# Patient Record
Sex: FEMALE | Race: WHITE | Employment: OTHER | URBAN - METROPOLITAN AREA
[De-identification: names, ages, dates, MRNs, and addresses within clinical notes are randomized per-mention and may not be internally consistent; named-entity substitution may affect disease eponyms.]

---

## 2018-06-30 ENCOUNTER — HOSPITAL ENCOUNTER (EMERGENCY)
Age: 68
Discharge: HOME | End: 2018-06-30
Attending: EMERGENCY MEDICINE
Payer: MEDICARE

## 2018-06-30 ENCOUNTER — APPOINTMENT (OUTPATIENT)
Dept: CT IMAGING | Age: 68
End: 2018-06-30
Attending: EMERGENCY MEDICINE
Payer: MEDICARE

## 2018-06-30 VITALS
SYSTOLIC BLOOD PRESSURE: 113 MMHG | WEIGHT: 206 LBS | BODY MASS INDEX: 32.33 KG/M2 | TEMPERATURE: 98 F | DIASTOLIC BLOOD PRESSURE: 58 MMHG | HEART RATE: 56 BPM | RESPIRATION RATE: 16 BRPM | HEIGHT: 67 IN | OXYGEN SATURATION: 98 %

## 2018-06-30 DIAGNOSIS — R42 VERTIGO: Primary | ICD-10-CM

## 2018-06-30 LAB
ANION GAP SERPL CALC-SCNC: 8 MMOL/L (ref 5–15)
BUN SERPL-MCNC: 21 MG/DL (ref 6–20)
BUN/CREAT SERPL: 27 (ref 12–20)
CALCIUM SERPL-MCNC: 9.6 MG/DL (ref 8.5–10.1)
CHLORIDE SERPL-SCNC: 107 MMOL/L (ref 97–108)
CO2 SERPL-SCNC: 27 MMOL/L (ref 21–32)
CREAT SERPL-MCNC: 0.78 MG/DL (ref 0.55–1.02)
ERYTHROCYTE [DISTWIDTH] IN BLOOD BY AUTOMATED COUNT: 12.7 % (ref 11.5–14.5)
GLUCOSE SERPL-MCNC: 94 MG/DL (ref 65–100)
HCT VFR BLD AUTO: 40.2 % (ref 35–47)
HGB BLD-MCNC: 13.2 G/DL (ref 11.5–16)
MAGNESIUM SERPL-MCNC: 2.3 MG/DL (ref 1.6–2.4)
MCH RBC QN AUTO: 29.7 PG (ref 26–34)
MCHC RBC AUTO-ENTMCNC: 32.8 G/DL (ref 30–36.5)
MCV RBC AUTO: 90.3 FL (ref 80–99)
NRBC # BLD: 0 K/UL (ref 0–0.01)
NRBC BLD-RTO: 0 PER 100 WBC
PLATELET # BLD AUTO: 199 K/UL (ref 150–400)
PMV BLD AUTO: 9.9 FL (ref 8.9–12.9)
POTASSIUM SERPL-SCNC: 4.5 MMOL/L (ref 3.5–5.1)
RBC # BLD AUTO: 4.45 M/UL (ref 3.8–5.2)
SODIUM SERPL-SCNC: 142 MMOL/L (ref 136–145)
WBC # BLD AUTO: 5.8 K/UL (ref 3.6–11)

## 2018-06-30 PROCEDURE — 83735 ASSAY OF MAGNESIUM: CPT | Performed by: EMERGENCY MEDICINE

## 2018-06-30 PROCEDURE — 74011636320 HC RX REV CODE- 636/320: Performed by: EMERGENCY MEDICINE

## 2018-06-30 PROCEDURE — 96374 THER/PROPH/DIAG INJ IV PUSH: CPT

## 2018-06-30 PROCEDURE — 70450 CT HEAD/BRAIN W/O DYE: CPT

## 2018-06-30 PROCEDURE — 36415 COLL VENOUS BLD VENIPUNCTURE: CPT | Performed by: EMERGENCY MEDICINE

## 2018-06-30 PROCEDURE — 70496 CT ANGIOGRAPHY HEAD: CPT

## 2018-06-30 PROCEDURE — 74011250636 HC RX REV CODE- 250/636: Performed by: EMERGENCY MEDICINE

## 2018-06-30 PROCEDURE — 99284 EMERGENCY DEPT VISIT MOD MDM: CPT

## 2018-06-30 PROCEDURE — 85027 COMPLETE CBC AUTOMATED: CPT | Performed by: EMERGENCY MEDICINE

## 2018-06-30 PROCEDURE — 80048 BASIC METABOLIC PNL TOTAL CA: CPT | Performed by: EMERGENCY MEDICINE

## 2018-06-30 RX ORDER — SODIUM CHLORIDE 0.9 % (FLUSH) 0.9 %
10 SYRINGE (ML) INJECTION
Status: COMPLETED | OUTPATIENT
Start: 2018-06-30 | End: 2018-06-30

## 2018-06-30 RX ORDER — ONDANSETRON 4 MG/1
4 TABLET, FILM COATED ORAL
Qty: 20 TAB | Refills: 0 | Status: SHIPPED | OUTPATIENT
Start: 2018-06-30 | End: 2020-02-24

## 2018-06-30 RX ORDER — SODIUM CHLORIDE 9 MG/ML
50 INJECTION, SOLUTION INTRAVENOUS
Status: COMPLETED | OUTPATIENT
Start: 2018-06-30 | End: 2018-06-30

## 2018-06-30 RX ORDER — ONDANSETRON 2 MG/ML
4 INJECTION INTRAMUSCULAR; INTRAVENOUS
Status: COMPLETED | OUTPATIENT
Start: 2018-06-30 | End: 2018-06-30

## 2018-06-30 RX ADMIN — ONDANSETRON 4 MG: 2 INJECTION INTRAMUSCULAR; INTRAVENOUS at 12:59

## 2018-06-30 RX ADMIN — SODIUM CHLORIDE 50 ML/HR: 900 INJECTION, SOLUTION INTRAVENOUS at 13:20

## 2018-06-30 RX ADMIN — IOPAMIDOL 100 ML: 755 INJECTION, SOLUTION INTRAVENOUS at 13:19

## 2018-06-30 RX ADMIN — Medication 10 ML: at 13:20

## 2018-06-30 NOTE — DISCHARGE INSTRUCTIONS
You were seen for dizziness and vertigo. Your CTA head and neck were negative for any dissection, aneurysm or clot. No signs of cerebellar problems. I recommended Zofran over the counter dramamine, and resting in a quiet environment until better. Avoid driving, heights, operating machinery until better. Follow up with Neurology if symptoms do not improve in 1 week. Call if symptoms persist or worsen or you develop new neurologic symptoms.   If meclizine does not improve your symptoms, follow up with Stephen Mcguire:    [address] [facility], [address]  [address]  [phone]    Vestibular Rehab therapists  Jak Handy, PT

## 2018-06-30 NOTE — ED PROVIDER NOTES
EMERGENCY DEPARTMENT HISTORY AND PHYSICAL EXAM      Date: 6/30/2018  Patient Name: Angela Le    History of Presenting Illness     Chief Complaint   Patient presents with    Dizziness     lightheaded \"to the point that feeling like she was going to fall down\" two weeks ago onset; went to [facility] sent to ED       History Provided By: Patient and EMS    HPI: Angela Le, 79 y.o. female with PMHx significant for HLD,vertigo and collagenous colitis, presents ambulatory to the ED with cc of continued dizziness that she describes as a \"room spinning\" sensation x 2 weeks. Pt notes that her dizziness is exacerbated with standing, ambulation, and movement of her head. Pt denies any associated sxs, nor any alleviating factors. Pt states that she has been taking Meclizine with no relief of her sxs. Pt states that she has a Hx of similar sxs as the result of her vertigo. Pt explains that she was evaluated for similar sxs at [facility] 4 days ago where she was found to be non-orthostatic and was discharged with Meclizine for relief of her dizziness secondary to vertigo. Pt reports that the dose of Meclizine that she was prescribed was too potent, so she halved the dose of medication after her first use. Pt notes that the medication has been unsuccessful in treating her dizziness which prompted her to come to the ED for further testing. Pt reports that she has been able to ambulate without difficulty since the onset of her sxs, and she has collagenous colitis which causes her to have chronic diarrhea at her baseline. Pt states that she has been drinking plenty of water, and consuming lots of potassium containing foods. Pt denies any recent colds, nor any past Hx of dehydration secondary to her colitis. Pt denies any PMHx of DM, nor any social Hx of tobacco use. Pt specifically denies N/V, worsening diarrhea, tinnitus, hearing loss, vision changes, numbness, weakness, or pain.     There are no other complaints, changes, or physical findings at this time. PCP: Armida Aburto MD        Past History     Past Medical History:  Past Medical History:   Diagnosis Date    Arthritis     Gastrointestinal disorder     colitis, collagenous    Hiatal hernia     Neurological disorder 2011    vertigo        Past Surgical History:  Past Surgical History:   Procedure Laterality Date    HX APPENDECTOMY      HX HEENT  2016    cataract     HX ORTHOPAEDIC      bilateral TKR       Family History:  History reviewed. No pertinent family history. Social History:  Social History   Substance Use Topics    Smoking status: Never Smoker    Smokeless tobacco: None    Alcohol use Yes       Allergies: Allergies   Allergen Reactions    Penicillins Hives    Sulfa (Sulfonamide Antibiotics) Other (comments)     \"pupils dialate\" gantrisin    Valium [Diazepam] Other (comments)     hallucinations         Review of Systems   Review of Systems   Constitutional: Negative. Negative for chills, diaphoresis and fever. HENT: Negative. Negative for congestion, sore throat and trouble swallowing. Eyes: Negative. Negative for photophobia, pain and redness. Respiratory: Negative. Negative for cough, chest tightness, shortness of breath and wheezing. Cardiovascular: Negative. Negative for chest pain and palpitations. Gastrointestinal: Negative. Negative for abdominal pain, blood in stool, diarrhea, nausea and vomiting. Genitourinary: Negative for difficulty urinating, dysuria and frequency. Musculoskeletal: Negative. Negative for arthralgias, myalgias, neck pain and neck stiffness. Skin: Negative. Neurological: Positive for dizziness. Negative for tremors, seizures, syncope, speech difficulty, light-headedness and headaches. Psychiatric/Behavioral: Negative. Negative for confusion. The patient is not nervous/anxious. All other systems reviewed and are negative.       Physical Exam   Physical Exam   Constitutional: She is oriented to person, place, and time. She appears well-developed and well-nourished. HENT:   Head: Normocephalic. TMs clear bilaterally   Eyes: EOM are normal. Pupils are equal, round, and reactive to light. Neck: Normal range of motion. Cardiovascular: Normal rate and regular rhythm. Pulmonary/Chest: Effort normal and breath sounds normal.   Abdominal: Soft. She exhibits no distension. There is no tenderness. Neurological: She is alert and oriented to person, place, and time. No cranial nerve deficit. CN 2-12 intact, 5/5 strength in all 4 extremities, Finger to nose intact, positive Romberg, no ataxic gait   Skin: Skin is warm and dry. Psychiatric: She has a normal mood and affect. Nursing note and vitals reviewed.       Diagnostic Study Results     Labs -     Recent Results (from the past 12 hour(s))   CBC W/O DIFF    Collection Time: 06/30/18 12:18 PM   Result Value Ref Range    WBC 5.8 3.6 - 11.0 K/uL    RBC 4.45 3.80 - 5.20 M/uL    HGB 13.2 11.5 - 16.0 g/dL    HCT 40.2 35.0 - 47.0 %    MCV 90.3 80.0 - 99.0 FL    MCH 29.7 26.0 - 34.0 PG    MCHC 32.8 30.0 - 36.5 g/dL    RDW 12.7 11.5 - 14.5 %    PLATELET 681 413 - 963 K/uL    MPV 9.9 8.9 - 12.9 FL    NRBC 0.0 0  WBC    ABSOLUTE NRBC 0.00 0.00 - 0.40 K/uL   METABOLIC PANEL, BASIC    Collection Time: 06/30/18 12:18 PM   Result Value Ref Range    Sodium 142 136 - 145 mmol/L    Potassium 4.5 3.5 - 5.1 mmol/L    Chloride 107 97 - 108 mmol/L    CO2 27 21 - 32 mmol/L    Anion gap 8 5 - 15 mmol/L    Glucose 94 65 - 100 mg/dL    BUN 21 (H) 6 - 20 MG/DL    Creatinine 0.78 0.55 - 1.02 MG/DL    BUN/Creatinine ratio 27 (H) 12 - 20      GFR est AA >60 >60 ml/min/1.73m2    GFR est non-AA >60 >60 ml/min/1.73m2    Calcium 9.6 8.5 - 10.1 MG/DL   MAGNESIUM    Collection Time: 06/30/18 12:18 PM   Result Value Ref Range    Magnesium 2.3 1.6 - 2.4 mg/dL       Radiologic Studies -     CT Results  (Last 48 hours)               06/30/18 1320  CTA HEAD NECK W CONT Preliminary result    Narrative:  Prelim report: Mild partially calcified plaque within the left and right carotid   bulbs. Common and internal carotid arteries are otherwise patent and of normal   caliber. Vertebral arteries are patent and of normal caliber. No intracranial   stenosis or occlusion. Final report to follow. 06/30/18 1320  CT HEAD WO CONT Final result    Impression:  IMPRESSION: No acute intracranial hemorrhage, mass or infarct. Narrative:  INDICATION: Vertigo, persistent, central        Exam: Noncontrast CT of the brain is performed with 5 mm collimation. CT dose reduction was achieved with the use of the standardized protocol   tailored for this examination and automatic exposure control for dose   modulation. FINDINGS: There is no acute intracranial hemorrhage, mass, mass effect or   herniation. Ventricular system is normal. The gray-white matter differentiation   is well-preserved. The mastoid air cells are well pneumatized. The visualized   paranasal sinuses are normal.               Medical Decision Making   I am the first provider for this patient. I reviewed the vital signs, available nursing notes, past medical history, past surgical history, family history and social history. Vital Signs-Reviewed the patient's vital signs. Patient Vitals for the past 12 hrs:   Temp Pulse Resp BP SpO2   06/30/18 1335 - (!) 56 16 133/61 99 %   06/30/18 1152 98 °F (36.7 °C) 63 16 136/65 98 %       Pulse Oximetry Analysis - 98% on RA    Cardiac Monitor:   Rate: 62 bpm  Rhythm: Normal Sinus Rhythm      Records Reviewed: Nursing Notes, Old Medical Records, Previous electrocardiograms, Ambulance Run Sheet, Previous Radiology Studies and Previous Laboratory Studies    Provider Notes (Medical Decision Making):   Patient presents with isolated vertigo.  Most likely peripheral vertigo rather than Central vertigo however given positive Romberg, will obtain CTA of the head and neck to ensure there is no posterior involvement. . DDx: BPPV, acute labyrinthitis, vestibular neuritis, Meniere's dx, otitis media, acoustic neuroma, medication toxicity (aminoglycosides, loop diuretics, aspirin). Unlikely central cause of vertigo such as posterior mass or stroke as there are no associated red flag symptoms including diplopia, dysmetria, dysarthria, ataxia, unilateral numbness or weakness. Prudence Marceline  results have been reviewed with her. She has been counseled regarding her diagnosis. She verbally conveys understanding and agreement of the signs, symptoms, diagnosis, treatment with Meclizine and prognosis and additionally agrees to follow up as recommended with Vestibular Rehab or Neurology PRN. ED Course:   Initial assessment performed. The patients presenting problems have been discussed, and they are in agreement with the care plan formulated and outlined with them. I have encouraged them to ask questions as they arise throughout their visit. Critical Care Time:   0    Disposition:  1:57 PM  The patient has been re-evaluated and is ready for discharge. Reviewed available results with patient. Counseled patient on diagnosis and care plan. Patient has expressed understanding, and all questions have been answered. Patient agrees with plan and agrees to follow up as recommended, or return to the ED if their symptoms worsen. Discharge instructions have been provided and explained to the patient, along with reasons to return to the ED. PLAN:  1. Current Discharge Medication List      START taking these medications    Details   ondansetron hcl (ZOFRAN) 4 mg tablet Take 1 Tab by mouth every eight (8) hours as needed for Nausea. Qty: 20 Tab, Refills: 0           2. Follow-up Information     Follow up With Details Comments Contact Info    Vestibular Rehab Schedule an appointment as soon as possible for a visit          Return to ED if worse     Diagnosis     Clinical Impression:   1. Vertigo        Attestations: This note is prepared by Katheran Oppenheim, acting as Scribe for Mahesh Ruano M.D. Mahesh Ruano M.D: The scribe's documentation has been prepared under my direction and personally reviewed by me in its entirety. I confirm that the note above accurately reflects all work, treatment, procedures, and medical decision making performed by me.

## 2018-07-05 ENCOUNTER — OFFICE VISIT (OUTPATIENT)
Dept: NEUROLOGY | Age: 68
End: 2018-07-05

## 2018-07-05 VITALS
DIASTOLIC BLOOD PRESSURE: 80 MMHG | HEART RATE: 78 BPM | OXYGEN SATURATION: 98 % | BODY MASS INDEX: 32.33 KG/M2 | HEIGHT: 67 IN | SYSTOLIC BLOOD PRESSURE: 140 MMHG | WEIGHT: 206 LBS

## 2018-07-05 DIAGNOSIS — H81.4 VERTIGO OF CENTRAL ORIGIN, UNSPECIFIED LATERALITY: Primary | ICD-10-CM

## 2018-07-05 NOTE — MR AVS SNAPSHOT
[address], 
[address], [address] [facility] 
[phone] Patient: Bobby Aguilar MRN: [id number] EVH:[id number] Visit Information Date & Time Provider Department Dept. Phone Encounter #  
 7/5/2018  8:00 AM Shashank Petersen MD Neurology Clinic at [facility] [phone] [id number] Follow-up Instructions Return in about 6 months (around 1/5/2019). Your Appointments 7/13/2018  3:15 PM  
New Patient with Liam Davies MD  
[facility] Cardiology Associate Adam Zavaleta Leon Dat ([facility]) Appt Note: REFERRED BY PCP [address] [place] [address]; REFERRED BY PCP [address] [place] [address] approved by Jana Graves on 7/3/18  
 [address] [address]  
[phone]  
  
   
 [address] [place] [address] [address] [facility] [address]  
  
    
 8/16/2018  2:40 PM  
Follow Up with Shashank Petersen MD  
Neurology Clinic at [facility]) Appt Note: FU,Dizziness,adt,7/5/2018  
 [address] [facility], 
[address] [place] [address], [address] [id number]  
[address] [facility], [address] [place] [address] Avenue, [address] [address] Upcoming Health Maintenance Date Due Hepatitis C Screening 1950 DTaP/Tdap/Td series (1 - Tdap) 5/17/1973 BREAST CANCER SCRN MAMMOGRAM 5/17/2002 FOBT Q 1 YEAR AGE 50-75 5/17/2002 ZOSTER VACCINE AGE 60> 3/17/2012 GLAUCOMA SCREENING Q2Y 5/17/2017 Bone Densitometry (Dexa) Screening 5/17/2017 Pneumococcal 65+ Low/Medium Risk (1 of 2 - PCV13) 5/17/2017 MEDICARE YEARLY EXAM 6/30/2018 Influenza Age 5 to Adult 8/1/2018 Allergies as of 7/5/2018  Review Complete On: 7/5/2018 By: Gui Rogers LPN Severity Noted Reaction Type Reactions Penicillins  06/30/2018    Hives Sulfa (Sulfonamide Antibiotics)  06/30/2018    Other (comments) \"pupils dialate\" gantrisin Valium [Diazepam]  06/30/2018    Other (comments)  
 hallucinations Current Immunizations  Never Reviewed No immunizations on file. Not reviewed this visit You Were Diagnosed With   
  
 Codes Comments Vertigo of central origin, unspecified laterality    -  Primary ICD-10-CM: H81.49 
ICD-9-CM: 386. 2 Vitals BP Pulse Height(growth percentile) Weight(growth percentile) SpO2 BMI  
 140/80 78 5' 7\" (1.702 m) 206 lb (93.4 kg) 98% 32.26 kg/m2 Smoking Status Never Smoker Vitals History BMI and BSA Data Body Mass Index Body Surface Area  
 32.26 kg/m 2 2.1 m 2 Your Updated Medication List  
  
   
This list is accurate as of 7/5/18  8:41 AM.  Always use your most recent med list.  
  
  
  
  
 ondansetron hcl 4 mg tablet Commonly known as:  Rebecca Loss Take 1 Tab by mouth every eight (8) hours as needed for Nausea. We Performed the Following REFERRAL TO PHYSICAL THERAPY [EQB48 Custom] Comments:  
 [facility] for [facility] Follow-up Instructions Return in about 6 months (around 1/5/2019). Referral Information Referral ID Referred By Referred To  
  
 [id number] Hermilo Canales Not Available Visits Status Start Date End Date 1 New Request 7/5/18 7/5/19 If your referral has a status of pending review or denied, additional information will be sent to support the outcome of this decision. Introducing [facility]! [facility] introduces Footnote patient portal. Now you can access parts of your medical record, email your doctor's office, and request medication refills online. 1. In your internet browser, go to [email or web address] 2. Click on the First Time User? Click Here link in the Sign In box. You will see the New Member Sign Up page. 3. Enter your Footnote Access Code exactly as it appears below.  You will not need to use this code after youve completed the sign-up process. If you do not sign up before the expiration date, you must request a new code. · [employer] Access Code: [email or web address] Expires: 9/28/2018 11:40 AM 
 
4. Enter the last four digits of your Social Security Number (xxxx) and Date of Birth (mm/dd/yyyy) as indicated and click Submit. You will be taken to the next sign-up page. 5. Create a [employer] ID. This will be your [employer] login ID and cannot be changed, so think of one that is secure and easy to remember. 6. Create a [employer] password. You can change your password at any time. 7. Enter your Password Reset Question and Answer. This can be used at a later time if you forget your password. 8. Enter your e-mail address. You will receive e-mail notification when new information is available in [address] 9. Click Sign Up. You can now view and download portions of your medical record. 10. Click the Download Summary menu link to download a portable copy of your medical information. If you have questions, please visit the Frequently Asked Questions section of the [employer] website. Remember, [employer] is NOT to be used for urgent needs. For medical emergencies, dial 911. Now available from your iPhone and Android! Please provide this summary of care documentation to your next provider. Your primary care clinician is listed as Vandana Grossman. If you have any questions after today's visit, please call [phone].

## 2018-07-05 NOTE — LETTER
7/5/2018 8:51 AM 
 
Patient:  Mykel Kelley YOB: 1950 Date of Visit: 7/5/2018 Dear Kyaw Gemran MD 
[address] [place] [address] [address] Marlee Almeida [address] VIA In Basket 
 : Thank you for referring Ms. Shanae Abdullahi to me for evaluation/treatment. Below are the relevant portions of my assessment and plan of care. HISTORY OF PRESENT ILLNESS Mykel Kelley is a 79 y.o. female. HPI Comments: This patient is a 53-year-old  female complaining of vertigo. Several weeks ago she awakened one morning to find that she was unsteady on her feet and had a resolved will rotational feeling of movement. She denies nausea or vomiting with it. Eyes any other complaints of headache, numbness, or tingling. She is not on any daily medications. She has a past medical history of collagenous colitis, bilateral knee replacements, moderate obesity, carpal tunnel syndrome, lateral, and bilateral cataract removal.  She denies any new visual complaints other than vertigo. She did have a CTA of her head which revealed very mild narrowing in the right PCA Dizziness The history is provided by the patient. This is a chronic problem. Associated symptoms include dizziness. Review of Systems Constitutional:  
     Review of systems is positive for occasional diarrhea, some joint pain, some shortness of breath, snoring, the above-mentioned vertigo. Complete review of systems done all others negative Neurological: Positive for dizziness. Current Outpatient Prescriptions on File Prior to Visit Medication Sig Dispense Refill  ondansetron hcl (ZOFRAN) 4 mg tablet Take 1 Tab by mouth every eight (8) hours as needed for Nausea. 20 Tab 0 No current facility-administered medications on file prior to visit. Past Medical History:  
Diagnosis Date  Arthritis  Gastrointestinal disorder   
 colitis, collagenous  Hiatal hernia  Neurological disorder 2011  
 vertigo No family history on file. Social History Substance Use Topics  Smoking status: Never Smoker  Smokeless tobacco: Never Used  Alcohol use Yes /80  Pulse 78  Ht 5' 7\" (1.702 m)  Wt 206 lb (93.4 kg)  SpO2 98%  BMI 32.26 kg/m2 Physical Exam  
She has very mild nystagmus looking to the right in both eyes. Constitutional: Oriented to person, place, and time, appears well-developed and well-nourished. No distress. HENT:  
Head: Normocephalic and atraumatic. Mouth/Throat: Oropharynx is clear and moist. No oropharyngeal exudate. Eyes: Conjunctivae and EOM are normal. Pupils are equal, round, and reactive to light. No scleral icterus. Neck: Normal range of motion. Neck supple. No thyromegaly present. Cardiovascular: Normal rate, regular rhythm and normal heart sounds. Musculoskeletal: Normal range of motion, exhibits no edema, tenderness or deformity. Lymphadenopathy: no cervical adenopathy. Neurological: Alert and oriented to person, place, and time. Normal strength and normal reflexes. Displays no atrophy and no tremor. No cranial nerve deficit or sensory deficit. Exhibits normal muscle tone. Displays a negative Romberg sign, no seizure activity. Coordination normal, gait normal.  
No Babinski's sign on the right side. No Babinski's sign on the left side. Speech, language and mentation are normal 
Visual fields are full to confrontation, funduscopic exam reveals flat discs, the retina and vasculature are normal  
Skin: Skin is warm and dry. No rash noted, not diaphoretic. No erythema. Psychiatric: Normal mood and affect,  behavior is normal. Judgment and thought content normal.  
Vitals reviewed. ASSESSMENT and PLAN 
VESTIBULAR VERTIGO This patient appears to have peripheral vestibular vertigo. Her CT and CTA just show a very minimal abnormality in the left PCA which is normal for age. I see no reason for further imaging at this time.   I am going to put her into therapy [facility] where they will attempt positioning maneuvers to reposition canaliths and vestibular therapy to reduce symptoms. I will plan to see her back in 6 weeks. I do not think further neuroimaging is needed at this time This note will not be viewable in [address] If you have questions, please do not hesitate to call me. I look forward to following Ms. Margi Owens along with you. Sincerely, Chalino Morillo MD

## 2018-07-09 ENCOUNTER — DOCUMENTATION ONLY (OUTPATIENT)
Dept: NEUROLOGY | Age: 68
End: 2018-07-09

## 2018-07-13 ENCOUNTER — OFFICE VISIT (OUTPATIENT)
Dept: CARDIOLOGY CLINIC | Age: 68
End: 2018-07-13

## 2018-07-13 VITALS
BODY MASS INDEX: 31.8 KG/M2 | SYSTOLIC BLOOD PRESSURE: 100 MMHG | HEART RATE: 56 BPM | HEIGHT: 67 IN | DIASTOLIC BLOOD PRESSURE: 68 MMHG | WEIGHT: 202.6 LBS | OXYGEN SATURATION: 97 % | RESPIRATION RATE: 18 BRPM

## 2018-07-13 DIAGNOSIS — K52.831 COLITIS, COLLAGENOUS: ICD-10-CM

## 2018-07-13 DIAGNOSIS — R42 DIZZINESS: ICD-10-CM

## 2018-07-13 DIAGNOSIS — R06.09 DOE (DYSPNEA ON EXERTION): ICD-10-CM

## 2018-07-13 DIAGNOSIS — R42 VERTIGO: ICD-10-CM

## 2018-07-13 DIAGNOSIS — R01.1 HEART MURMUR: Primary | ICD-10-CM

## 2018-07-13 DIAGNOSIS — M15.9 GENERALIZED OA: ICD-10-CM

## 2018-07-13 RX ORDER — GLUCOSAMINE/CHONDR SU A SOD 167-133 MG
500 CAPSULE ORAL
COMMUNITY
End: 2020-02-24

## 2018-07-13 RX ORDER — BISMUTH SUBSALICYLATE 262 MG
1 TABLET,CHEWABLE ORAL DAILY
COMMUNITY

## 2018-07-13 RX ORDER — MELATONIN
DAILY
COMMUNITY
End: 2020-02-24

## 2018-07-13 NOTE — PROGRESS NOTES
1. Have you been to the ER, urgent care clinic since your last visit? Hospitalized since your last visit? No    2. Have you seen or consulted any other health care providers outside of the [address] [place] [address] since your last visit? Include any pap smears or colon screening. No    Chief Complaint   Patient presents with    Shortness of Breath     Ref by Dr. Nikki Turner, SOB & dizziness rest or exertion.

## 2018-07-13 NOTE — MR AVS SNAPSHOT
[facility] [address] 
[phone] Patient: Gilberto Rodriguez MRN: [id number] Visit Information Date & Time Provider Department Dept. Phone Encounter #  
 7/13/2018  3:15 PM Grace Medeiros, 1024 [facility] Jacquie [phone] [id number] Your Appointments 7/17/2018  2:30 PM  
ECHO CARDIOGRAMS 2D with 726 [facility] [address] [place] [place] [address]) Appt Note: $0CP 7/13/18ksr /per Dr Zulema Solis [address] [place] [address]  
[phone] [address] [address]  
  
    
 7/19/2018 10:00 AM  
STRESS TEST with JOSE ANTONIO [facility] [address] [place] [place] [address]) Appt Note: $0CP 7/13/18ksr /per Dr TORRES/  
 [address]  
[phone] [address] [place] [address]  
  
    
 7/19/2018 11:00 AM  
PROCEDURE with [facility] [address] [place] [place] [address]) Appt Note: $0CP 7/13/18ksr /per Dr TORRES/  
 [address] [place] [address]  
[phone] [address] [address]  
  
    
 8/16/2018  2:40 PM  
Follow Up with Perry Oneil MD  
Neurology Clinic at [address]) Appt Note: FU,Dizziness,adt,7/5/2018  
 [address], [address] [address]  
[address], [address] [facility] [address] [address] Upcoming Health Maintenance Date Due Hepatitis C Screening 1950 DTaP/Tdap/Td series (1 - Tdap) 5/17/1973 BREAST CANCER SCRN MAMMOGRAM 5/17/2002 FOBT Q 1 YEAR AGE 50-75 5/17/2002 ZOSTER VACCINE AGE 60> 3/17/2012 GLAUCOMA SCREENING Q2Y 5/17/2017 Bone Densitometry (Dexa) Screening 5/17/2017 Pneumococcal 65+ Low/Medium Risk (1 of 2 - PCV13) 5/17/2017 MEDICARE YEARLY EXAM 6/30/2018 Influenza Age 5 to Adult 8/1/2018 Allergies as of 7/13/2018  Review Complete On: 7/13/2018 By: Colt Wade LPN Severity Noted Reaction Type Reactions Penicillins  06/30/2018    Hives Sulfa (Sulfonamide Antibiotics)  06/30/2018    Other (comments) \"pupils dialate\" gantrisin Valium [Diazepam]  06/30/2018    Other (comments)  
 hallucinations Current Immunizations  Never Reviewed No immunizations on file. Not reviewed this visit Vitals BP Pulse Resp Height(growth percentile) Weight(growth percentile) SpO2  
 100/68 (BP 1 Location: Left arm, BP Patient Position: Standing) (!) 56 18 5' 7\" (1.702 m) 202 lb 9.6 oz (91.9 kg) 97% BMI Smoking Status 31.73 kg/m2 Never Smoker Vitals History BMI and BSA Data Body Mass Index Body Surface Area 31.73 kg/m 2 2.08 m 2 Your Updated Medication List  
  
   
This list is accurate as of 7/13/18  4:22 PM.  Always use your most recent med list.  
  
  
  
  
 COQ10  PO Take  by mouth daily. multivitamin tablet Commonly known as:  ONE A DAY Take 1 Tab by mouth daily. nicotinic acid 500 mg tablet Commonly known as:  NIACIN Take 500 mg by mouth Daily (before breakfast). ondansetron hcl 4 mg tablet Commonly known as:  Leopoldo Marus Take 1 Tab by mouth every eight (8) hours as needed for Nausea. VITAMIN D3 1,000 unit tablet Generic drug:  cholecalciferol Take  by mouth daily. Introducing [facility]! Mendy Friend introduces [employer] patient portal. Now you can access parts of your medical record, email your doctor's office, and request medication refills online. 1. In your internet browser, go to [email or web address] 2. Click on the First Time User? Click Here link in the Sign In box. You will see the New Member Sign Up page. 3. Enter your [employer] Access Code exactly as it appears below. You will not need to use this code after youve completed the sign-up process. If you do not sign up before the expiration date, you must request a new code. · [employer] Access Code: [email or web address] Expires: 9/28/2018 11:40 AM 
 
4. Enter the last four digits of your Social Security Number (xxxx) and Date of Birth (mm/dd/yyyy) as indicated and click Submit. You will be taken to the next sign-up page. 5. Create a [employer] ID. This will be your [employer] login ID and cannot be changed, so think of one that is secure and easy to remember. 6. Create a [employer] password. You can change your password at any time. 7. Enter your Password Reset Question and Answer. This can be used at a later time if you forget your password. 8. Enter your e-mail address. You will receive e-mail notification when new information is available in [address] 9. Click Sign Up. You can now view and download portions of your medical record. 10. Click the Download Summary menu link to download a portable copy of your medical information. If you have questions, please visit the Frequently Asked Questions section of the [employer] website. Remember, [employer] is NOT to be used for urgent needs. For medical emergencies, dial 911. Now available from your iPhone and Android! Please provide this summary of care documentation to your next provider. Your primary care clinician is listed as Vandana Grossman. If you have any questions after today's visit, please call [phone].

## 2018-07-15 PROBLEM — R42 DIZZINESS: Status: ACTIVE | Noted: 2018-07-15

## 2018-07-15 PROBLEM — R42 VERTIGO: Status: ACTIVE | Noted: 2018-07-15

## 2018-07-15 PROBLEM — M15.9 GENERALIZED OA: Status: ACTIVE | Noted: 2018-07-15

## 2018-07-15 PROBLEM — R01.1 HEART MURMUR: Status: ACTIVE | Noted: 2018-07-15

## 2018-07-15 PROBLEM — K52.831 COLITIS, COLLAGENOUS: Status: ACTIVE | Noted: 2018-07-15

## 2018-07-15 NOTE — PROGRESS NOTES
[address] [place] [address]        [phone] NEW PATIENT HPI/FOLLOW-UP 
 
NAME:  Tylor Ernandez :   1950 MRN:   [id number] PCP:  Janay Mcdonald MD 
 
    
 
Subjective: The patient is a 79y.o. year old female , non-smoker essentially in good health with PMHx of obesity who presents with recent hx of last few months of sudden onset vertigo initially while sitting. Floor started to spin around. Referred to Neurology and felt to be due to mal-aligned inner ear crystals for which she is being manipulated with some response. Also admits to intermittent positional and non-positional dizziness,lightheadedness. Has had hx of \"heart murmur\" forever since childhood. Told \"to keep an eye on it\". Also reports some tolerable LIZARRAGA more noticeable lately without chest pain. Sent for cardiology evaluation. 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 Denies change in exercise tolerance, chest pain, edema, medication intolerance, palpitations, shortness of breath, PND/orthopnea wheezing, sputum, syncope, dizziness or light headedness. Review of Systems: 
 
 [] Unable to obtain  ROS due to  []mental status change  []sedated   []intubated 
 [x]Total of 12 systems reviewed as follows: 
Constitutional: negative fever, negative chills, negative weight loss Eyes:   negative visual changes ENT:   negative sore throat, tongue or lip swelling Chest/Resp:  negative cough, wheezing, + dyspnea,tenderness Cards:  negative for chest pain, decreased exercise endurance, palpitations, lower extremity edema,PND,orthopnea,syncope,++dizziness,lightheadedness GI:   negative for nausea, vomiting, diarrhea, and abdominal pain :  negative for frequency, dysuria Integument:  negative for rash and pruritus Heme:  negative for easy bruising and gum/nose bleeding Musculoskel: negative for myalgias,  back pain and muscle weakness Neuro:  negative for headaches, dizziness, ++vertigo Psych:  +feelings of anxiety, -depression Past Medical History:  
Diagnosis Date  Arthritis  Gastrointestinal disorder   
 colitis, collagenous  Hiatal hernia  Neurological disorder 2011  
 vertigo There are no active problems to display for this patient. Past Surgical History:  
Procedure Laterality Date  HX APPENDECTOMY  HX HEENT  2016  
 cataract  HX ORTHOPAEDIC    
 bilateral TKR Allergies Allergen Reactions  Penicillins Hives  Sulfa (Sulfonamide Antibiotics) Other (comments) \"pupils dialate\" gantrisin  Valium [Diazepam] Other (comments)  
  hallucinations No family history on file. Social History Social History  Marital status:  Spouse name: N/A  
 Number of children: N/A  
 Years of education: N/A Occupational History  Not on file. Social History Main Topics  Smoking status: Never Smoker  Smokeless tobacco: Never Used  Alcohol use Yes Comment: 12 oz wine daily  Drug use: No  
 Sexual activity: Not on file Other Topics Concern  Not on file Social History Narrative Current Outpatient Prescriptions Medication Sig  
 multivitamin (ONE A DAY) tablet Take 1 Tab by mouth daily.  nicotinic acid (NIACIN) 500 mg tablet Take 500 mg by mouth Daily (before breakfast).  cholecalciferol (VITAMIN D3) 1,000 unit tablet Take  by mouth daily.  ubidecarenone/vitamin E mixed (COQ10  PO) Take  by mouth daily.  ondansetron hcl (ZOFRAN) 4 mg tablet Take 1 Tab by mouth every eight (8) hours as needed for Nausea. No current facility-administered medications for this visit. I have reviewed the nurses notes, vitals, problem list, allergy list, medical history, family medical, social history and medications.  
 
 
 
Objective:  
 
Physical Exam:  
 
Vitals:  
 07/13/18 1518 07/13/18 1525 07/13/18 1526 BP: 110/70 120/70 100/68 Pulse: (!) 56 Resp: 18 SpO2: 97% Weight: 202 lb 9.6 oz (91.9 kg) Height: 5' 7\" (1.702 m) Body mass index is 31.73 kg/(m^2). General: Well developed,obese in no acute distress. HEENT: No carotid bruits, no JVD, trach is midline. Heart:  Normal S1/S2 negative S3 or S4. Regular, Gr 2-3/6 SE murmur best aortic area and left back, no gallop or rub.  
Respiratory: Clear bilaterally, no wheezing or rales Abdomen:   Soft, non-tender, bowel sounds are active.  
Extremities:  No edema, normal cap refill, no cyanosis. Neuro: A&Ox3, speech clear, gait stable. Skin: Skin color is normal. No rashes or lesions. No diaphoresis. Vascular: 2+ pulses symmetric in all extremities Data Review:  
 
 
Cardiographics: 
 
EKG: from PCP's office--NSR,WNL Cardiology Labs: 
 
No results found for this or any previous visit. No results found for: CHOL, CHOLX, CHLST, [address], [address], HDL, LDL, LDLC, DLDLP, TGLX, TRIGL, TRIGP, CHHD, CHHDX Lab Results Component Value Date/Time Sodium 142 06/30/2018 12:18 PM  
 Potassium 4.5 06/30/2018 12:18 PM  
 Chloride 107 06/30/2018 12:18 PM  
 CO2 27 06/30/2018 12:18 PM  
 Anion gap 8 06/30/2018 12:18 PM  
 Glucose 94 06/30/2018 12:18 PM  
 BUN 21 (H) 06/30/2018 12:18 PM  
 Creatinine 0.78 06/30/2018 12:18 PM  
 BUN/Creatinine ratio 27 (H) 06/30/2018 12:18 PM  
 GFR est AA >60 06/30/2018 12:18 PM  
 GFR est non-AA >60 06/30/2018 12:18 PM  
 Calcium 9.6 06/30/2018 12:18 PM  
  
 
 
Assessment: ICD-10-CM ICD-9-CM 1. Heart murmur R01.1 785.2 2D ECHO COMPLETE ADULT (TTE) W OR WO CONTR  
   STRESS TEST CARDIAC  
   ECG EVENT RECORD MONITORING SET-UP 2. LIZARRAGA (dyspnea on exertion) R06.09 786.09 2D ECHO COMPLETE ADULT (TTE) W OR WO CONTR  
   STRESS TEST CARDIAC  
   ECG EVENT RECORD MONITORING SET-UP 3. Vertigo R42 780.4 2D ECHO COMPLETE ADULT (TTE) W OR WO CONTR  
   STRESS TEST CARDIAC  
   ECG EVENT RECORD MONITORING SET-UP 4. Dizziness R42 780.4 2D ECHO COMPLETE ADULT (TTE) W OR WO CONTR  
   STRESS TEST CARDIAC  
   ECG EVENT RECORD MONITORING SET-UP 5. Generalized OA M15.9 715.00 2D ECHO COMPLETE ADULT (TTE) W OR WO CONTR  
   STRESS TEST CARDIAC  
   ECG EVENT RECORD MONITORING SET-UP 6. Colitis, collagenous K52.831 558.9 2D ECHO COMPLETE ADULT (TTE) W OR WO CONTR  
   STRESS TEST CARDIAC  
   ECG EVENT RECORD MONITORING SET-UP Discussion: Patient presents at this time with constellation of symptoms including vertigo,dizziness. I doubt cardiac influence. Has orthostatic drop sitting to standing but no symptoms. Has systolic heart murmur--likely aortic stenosis. Will  eval with echo. Also will obtain EM to exclude conduction and/or rhythm disturbances contributing to dizziness and vertigo and routine stress test to exclude low potential CAD. Has arthritic knees but feels she can walk on treadmill to assess LIZARRAGA. Plan: 1. Continue same meds. Lipid profile and labs followed by PCP. 2.Encouraged to exercise to tolerance, lose weight and follow low fat, low cholesterol, low sodium predominantly Plant-based (consider Mediterranean) diet. Call with questions or concerns. Will follow up any test results by phone and/or f/u here in office if needed. Cayden Felder 3.Follow up: 2-3  weeks I have discussed the diagnosis with the patient and the intended plan as seen in the above orders. The patient has received an after-visit summary and questions were answered concerning future plans. I have discussed any concerning medication side effects and warnings with the patient as well. Gray Bob MD 
7/15/2018

## 2018-07-16 PROBLEM — R06.09 DOE (DYSPNEA ON EXERTION): Status: ACTIVE | Noted: 2018-07-16

## 2018-07-17 ENCOUNTER — CLINICAL SUPPORT (OUTPATIENT)
Dept: CARDIOLOGY CLINIC | Age: 68
End: 2018-07-17

## 2018-07-17 DIAGNOSIS — R01.1 HEART MURMUR: ICD-10-CM

## 2018-07-17 DIAGNOSIS — R42 DIZZINESS: Primary | ICD-10-CM

## 2018-07-19 ENCOUNTER — CLINICAL SUPPORT (OUTPATIENT)
Dept: CARDIOLOGY CLINIC | Age: 68
End: 2018-07-19

## 2018-07-19 DIAGNOSIS — R42 DIZZINESS: ICD-10-CM

## 2018-07-19 DIAGNOSIS — K52.831 COLITIS, COLLAGENOUS: ICD-10-CM

## 2018-07-19 DIAGNOSIS — R06.09 DOE (DYSPNEA ON EXERTION): ICD-10-CM

## 2018-07-19 DIAGNOSIS — M15.9 GENERALIZED OA: ICD-10-CM

## 2018-07-19 DIAGNOSIS — R01.1 HEART MURMUR: ICD-10-CM

## 2018-07-19 DIAGNOSIS — R42 VERTIGO: ICD-10-CM

## 2018-07-19 NOTE — PROGRESS NOTES
Identified patient using full name and . Patient education for testing complete. Patient verbalized understanding.     Coni Dillon, RN

## 2018-07-23 ENCOUNTER — TELEPHONE (OUTPATIENT)
Dept: CARDIOLOGY CLINIC | Age: 68
End: 2018-07-23

## 2018-07-23 NOTE — TELEPHONE ENCOUNTER
----- Message from Linda Olivas MD sent at 7/21/2018 11:21 AM EDT -----  Regarding: ECHO  ESSENTIALLY NORMAL    B  ----- Message -----     From: Rommel, Card Result In     Sent: 7/19/2018   1:19 PM       To: Linda Olivas MD        Left message for pt to call me back. Returned pt's call,verified pt with two pt identifiers, told pt her echo is normal. She verbalized understanding.

## 2018-07-24 ENCOUNTER — TELEPHONE (OUTPATIENT)
Dept: CARDIOLOGY CLINIC | Age: 68
End: 2018-07-24

## 2018-07-25 NOTE — TELEPHONE ENCOUNTER
[facility] to call me.     EST and echo normal.       MD Awilda Weathers, LPN       Caller: Unspecified (Yesterday, 11:38 AM)                     NORMAL STRESS TEST AND ECHO     THX     B       .

## 2018-07-26 NOTE — TELEPHONE ENCOUNTER
Spoke with patient. Verified patient with two patient identifiers. Advised echo and EST normal.  Patient verbalized understanding.

## 2018-08-16 ENCOUNTER — OFFICE VISIT (OUTPATIENT)
Dept: NEUROLOGY | Age: 68
End: 2018-08-16

## 2018-08-16 ENCOUNTER — TELEPHONE (OUTPATIENT)
Dept: CARDIOLOGY CLINIC | Age: 68
End: 2018-08-16

## 2018-08-16 VITALS
SYSTOLIC BLOOD PRESSURE: 128 MMHG | DIASTOLIC BLOOD PRESSURE: 80 MMHG | WEIGHT: 201 LBS | BODY MASS INDEX: 31.55 KG/M2 | OXYGEN SATURATION: 98 % | HEART RATE: 80 BPM | HEIGHT: 67 IN

## 2018-08-16 DIAGNOSIS — G43.109 OCULAR MIGRAINE: ICD-10-CM

## 2018-08-16 DIAGNOSIS — H81.4 VERTIGO OF CENTRAL ORIGIN, UNSPECIFIED LATERALITY: Primary | ICD-10-CM

## 2018-08-16 NOTE — PROGRESS NOTES
HISTORY OF PRESENT ILLNESS  Abdirahman Gaspar is a 79 y.o. female. HPI Comments: This patient is a 61-year-old  female complaining of vertigo. Several weeks ago she awakened one morning to find that she was unsteady on her feet and had a resolved will rotational feeling of movement. She denies nausea or vomiting with it. Eyes any other complaints of headache, numbness, or tingling. She is not on any daily medications. She has a past medical history of collagenous colitis, bilateral knee replacements, moderate obesity, carpal tunnel syndrome, lateral, and bilateral cataract removal.  She denies any new visual complaints other than vertigo. She did have a CTA of her head which revealed very mild narrowing in the right PCA    Dizziness    The history is provided by the patient. This is a chronic problem. Associated symptoms include dizziness. Review of Systems   Constitutional:        Review of systems is negative  Complete review of systems done all others negative       Current Outpatient Prescriptions on File Prior to Visit   Medication Sig Dispense Refill    multivitamin (ONE A DAY) tablet Take 1 Tab by mouth daily.  nicotinic acid (NIACIN) 500 mg tablet Take 500 mg by mouth Daily (before breakfast).  cholecalciferol (VITAMIN D3) 1,000 unit tablet Take  by mouth daily.  ubidecarenone/vitamin E mixed (COQ10  PO) Take  by mouth daily.  ondansetron hcl (ZOFRAN) 4 mg tablet Take 1 Tab by mouth every eight (8) hours as needed for Nausea. 20 Tab 0     No current facility-administered medications on file prior to visit. Past Medical History:   Diagnosis Date    Arthritis     Gastrointestinal disorder     colitis, collagenous    Hiatal hernia     Neurological disorder 2011    vertigo      History reviewed. No pertinent family history.   Social History   Substance Use Topics    Smoking status: Never Smoker    Smokeless tobacco: Never Used    Alcohol use Yes Comment: 12 oz wine daily     Ht 5' 7\" (1.702 m)  Wt 201 lb (91.2 kg)  BMI 31.48 kg/m2    Physical Exam   She has very mild nystagmus looking to the right in both eyes. Constitutional: Oriented to person, place, and time, appears well-developed and well-nourished. No distress. Farzaneh Brunson Displays no atrophy and no tremor. Speech, language and mentation are normal  Psychiatric: Normal mood and affect,  behavior is normal. Judgment and thought content normal.   Vitals reviewed. ASSESSMENT and PLAN  VESTIBULAR VERTIGO  This patient appears to have peripheral vestibular vertigo. Her CT and CTA just show a very minimal abnormality in the left PCA which is normal for age. She did well in physical therapy with vestibular positioning maneuvers over at [facility]. She is symptom-free now. I see no reason for further action. She has vertigo again I will have her call and we will put her into physical therapy and I will see her back as soon as is reasonably possible. OCCULAR MIGRAINE  This patient has ocular migraine without progression to headache. She has a strong family history of migraine. She no reason to treat for non-headache ocular migraine at this time.

## 2018-08-16 NOTE — MR AVS SNAPSHOT
[address] [facility], 
[address], [address] [facility] 
[phone] Patient: Leanna Shook MRN: [id number] Visit Information Date & Time Provider Department Dept. Phone Encounter #  
 8/16/2018  2:40 PM Wes Rowell MD Neurology Clinic at [facility] [phone] [id number] Your Appointments 8/16/2018  2:40 PM  
Follow Up with Wes Rowell MD  
Neurology Clinic at [facility] [address]) Appt Note: FU,Dizziness,adt,7/5/2018  
 [address], 
[address], [address] [address]  
[address] [facility], [address], [address] [address] [address] Upcoming Health Maintenance Date Due Hepatitis C Screening 1950 DTaP/Tdap/Td series (1 - Tdap) 5/17/1973 BREAST CANCER SCRN MAMMOGRAM 5/17/2002 FOBT Q 1 YEAR AGE 50-75 5/17/2002 ZOSTER VACCINE AGE 60> 3/17/2012 GLAUCOMA SCREENING Q2Y 5/17/2017 Bone Densitometry (Dexa) Screening 5/17/2017 Pneumococcal 65+ Low/Medium Risk (1 of 2 - PCV13) 5/17/2017 MEDICARE YEARLY EXAM 6/30/2018 Influenza Age 5 to Adult 8/1/2018 Allergies as of 8/16/2018  Review Complete On: 8/16/2018 By: Wes Rowell MD  
  
 Severity Noted Reaction Type Reactions Penicillins  06/30/2018    Hives Sulfa (Sulfonamide Antibiotics)  06/30/2018    Other (comments) \"pupils dialate\" gantrisin Valium [Diazepam]  06/30/2018    Other (comments)  
 hallucinations Current Immunizations  Never Reviewed No immunizations on file. Not reviewed this visit Vitals BP Pulse Height(growth percentile) Weight(growth percentile) SpO2 BMI  
 128/80 80 5' 7\" (1.702 m) 201 lb (91.2 kg) 98% 31.48 kg/m2 Smoking Status Never Smoker Vitals History BMI and BSA Data Body Mass Index Body Surface Area 31.48 kg/m 2 2.08 m 2 Your Updated Medication List  
  
   
This list is accurate as of 8/16/18  2:37 PM.  Always use your most recent med list.  
  
  
  
  
 COQ10  PO Take  by mouth daily. multivitamin tablet Commonly known as:  ONE A DAY Take 1 Tab by mouth daily. nicotinic acid 500 mg tablet Commonly known as:  NIACIN Take 500 mg by mouth Daily (before breakfast). ondansetron hcl 4 mg tablet Commonly known as:  Donnel Blank Take 1 Tab by mouth every eight (8) hours as needed for Nausea. VITAMIN D3 1,000 unit tablet Generic drug:  cholecalciferol Take  by mouth daily. Patient Instructions Office Policies 
o Phone calls/patient messages: Please allow up to 24 hours for someone in the office to contact you about your call or message. Be mindful your provider may be out of the office or your message may require further review. We encourage you to use To8to for your messages as this is a faster, more efficient way to communicate with our office 
o Medication Refills: 
Prescription medications require up to 48 business hours to process. We encourage you to use To8to for your refills. For controlled medications: Please allow up to 72 business hours to process. Certain medications may require you to  a written prescription at our office. NO narcotic/controlled medications will be prescribed after 4pm Monday through Friday or on weekends 
o Form/Paperwork Completion: 
Please note there is a $25 fee for all paperwork completed by our providers. We ask that you allow 7-14 business days. Pre-payment is due prior to picking up/faxing the completed form. You may also download your forms to To8to to have your doctor print off. 
o Test Results: In order for a patient to obtain test results, an appointment must be made with the physician to review the results. Test results cannot be discussed over the phone. Introducing [facility]! Maylin Perez introduces [employer] patient portal. Now you can access parts of your medical record, email your doctor's office, and request medication refills online. 1. In your internet browser, go to [email or web address] 2. Click on the First Time User? Click Here link in the Sign In box. You will see the New Member Sign Up page. 3. Enter your [employer] Access Code exactly as it appears below. You will not need to use this code after youve completed the sign-up process. If you do not sign up before the expiration date, you must request a new code. · [employer] Access Code: [email or web address] Expires: 9/28/2018 11:40 AM 
 
4. Enter the last four digits of your Social Security Number (xxxx) and Date of Birth (mm/dd/yyyy) as indicated and click Submit. You will be taken to the next sign-up page. 5. Create a [employer] ID. This will be your [employer] login ID and cannot be changed, so think of one that is secure and easy to remember. 6. Create a [employer] password. You can change your password at any time. 7. Enter your Password Reset Question and Answer. This can be used at a later time if you forget your password. 8. Enter your e-mail address. You will receive e-mail notification when new information is available in [address] 9. Click Sign Up. You can now view and download portions of your medical record. 10. Click the Download Summary menu link to download a portable copy of your medical information. If you have questions, please visit the Frequently Asked Questions section of the [employer] website. Remember, [employer] is NOT to be used for urgent needs. For medical emergencies, dial 911. Now available from your iPhone and Android! Please provide this summary of care documentation to your next provider. Your primary care clinician is listed as Vandana Grossman. If you have any questions after today's visit, please call [phone].

## 2018-08-16 NOTE — PATIENT INSTRUCTIONS
Office Policies  o Phone calls/patient messages:  Please allow up to 24 hours for someone in the office to contact you about your call or message. Be mindful your provider may be out of the office or your message may require further review. We encourage you to use Skoodat for your messages as this is a faster, more efficient way to communicate with our office  o Medication Refills:  Prescription medications require up to 48 business hours to process. We encourage you to use Skoodat for your refills. For controlled medications: Please allow up to 72 business hours to process. Certain medications may require you to  a written prescription at our office. NO narcotic/controlled medications will be prescribed after 4pm Monday through Friday or on weekends  o Form/Paperwork Completion:  Please note there is a $25 fee for all paperwork completed by our providers. We ask that you allow 7-14 business days. Pre-payment is due prior to picking up/faxing the completed form. You may also download your forms to Skoodat to have your doctor print off.  o Test Results: In order for a patient to obtain test results, an appointment must be made with the physician to review the results. Test results cannot be discussed over the phone.

## 2018-10-24 ENCOUNTER — HOSPITAL ENCOUNTER (OUTPATIENT)
Dept: NUCLEAR MEDICINE | Age: 68
Discharge: HOME | End: 2018-10-24
Attending: ORTHOPAEDIC SURGERY
Payer: MEDICARE

## 2018-10-24 DIAGNOSIS — M17.11 PRIMARY OSTEOARTHRITIS OF RIGHT KNEE: ICD-10-CM

## 2018-10-24 DIAGNOSIS — M17.12 PRIMARY LOCALIZED OSTEOARTHRITIS OF LEFT KNEE: ICD-10-CM

## 2018-10-24 PROCEDURE — 78300 BONE IMAGING LIMITED AREA: CPT

## 2020-02-24 ENCOUNTER — APPOINTMENT (OUTPATIENT)
Dept: CT IMAGING | Age: 70
DRG: 813 | End: 2020-02-24
Attending: INTERNAL MEDICINE
Payer: MEDICARE

## 2020-02-24 ENCOUNTER — HOSPITAL ENCOUNTER (INPATIENT)
Age: 70
LOS: 2 days | Discharge: HOME | DRG: 813 | End: 2020-02-26
Attending: EMERGENCY MEDICINE | Admitting: INTERNAL MEDICINE
Payer: MEDICARE

## 2020-02-24 DIAGNOSIS — D69.6 THROMBOCYTOPENIA (HCC): Primary | ICD-10-CM

## 2020-02-24 DIAGNOSIS — D69.3 CHRONIC ITP (IDIOPATHIC THROMBOCYTOPENIA) (HCC): ICD-10-CM

## 2020-02-24 LAB
ALBUMIN SERPL-MCNC: 3.6 G/DL (ref 3.5–5)
ALBUMIN/GLOB SERPL: 1 {RATIO} (ref 1.1–2.2)
ALP SERPL-CCNC: 80 U/L (ref 45–117)
ALT SERPL-CCNC: 22 U/L (ref 12–78)
ANION GAP SERPL CALC-SCNC: 4 MMOL/L (ref 5–15)
AST SERPL-CCNC: 21 U/L (ref 15–37)
BASOPHILS # BLD: 0 K/UL (ref 0–0.1)
BASOPHILS NFR BLD: 0 % (ref 0–1)
BILIRUB SERPL-MCNC: 0.4 MG/DL (ref 0.2–1)
BUN SERPL-MCNC: 17 MG/DL (ref 6–20)
BUN/CREAT SERPL: 24 (ref 12–20)
CALCIUM SERPL-MCNC: 9 MG/DL (ref 8.5–10.1)
CHLORIDE SERPL-SCNC: 109 MMOL/L (ref 97–108)
CO2 SERPL-SCNC: 27 MMOL/L (ref 21–32)
CREAT SERPL-MCNC: 0.72 MG/DL (ref 0.55–1.02)
DIFFERENTIAL METHOD BLD: ABNORMAL
EOSINOPHIL # BLD: 0 K/UL (ref 0–0.4)
EOSINOPHIL NFR BLD: 1 % (ref 0–7)
ERYTHROCYTE [DISTWIDTH] IN BLOOD BY AUTOMATED COUNT: 13.7 % (ref 11.5–14.5)
FOLATE SERPL-MCNC: 44.5 NG/ML (ref 5–21)
GLOBULIN SER CALC-MCNC: 3.7 G/DL (ref 2–4)
GLUCOSE SERPL-MCNC: 95 MG/DL (ref 65–100)
HAV IGM SER QL: NONREACTIVE
HBV CORE IGM SER QL: NONREACTIVE
HBV SURFACE AG SER QL: <0.1 INDEX
HBV SURFACE AG SER QL: NEGATIVE
HCT VFR BLD AUTO: 34.8 % (ref 35–47)
HCV AB SERPL QL IA: NONREACTIVE
HCV COMMENT,HCGAC: NORMAL
HGB BLD-MCNC: 11.4 G/DL (ref 11.5–16)
HIV 1+2 AB+HIV1 P24 AG SERPL QL IA: NONREACTIVE
HIV12 RESULT COMMENT, HHIVC: NORMAL
IMM GRANULOCYTES # BLD AUTO: 0 K/UL (ref 0–0.04)
IMM GRANULOCYTES NFR BLD AUTO: 0 % (ref 0–0.5)
INR PPP: 1 (ref 0.9–1.1)
LYMPHOCYTES # BLD: 1.3 K/UL (ref 0.8–3.5)
LYMPHOCYTES NFR BLD: 27 % (ref 12–49)
MCH RBC QN AUTO: 29.5 PG (ref 26–34)
MCHC RBC AUTO-ENTMCNC: 32.8 G/DL (ref 30–36.5)
MCV RBC AUTO: 90.2 FL (ref 80–99)
MONOCYTES # BLD: 0.1 K/UL (ref 0–1)
MONOCYTES NFR BLD: 2 % (ref 5–13)
NEUTS SEG # BLD: 3.3 K/UL (ref 1.8–8)
NEUTS SEG NFR BLD: 70 % (ref 32–75)
NRBC # BLD: 0 K/UL (ref 0–0.01)
NRBC BLD-RTO: 0 PER 100 WBC
PATH REV BLD -IMP: NORMAL
PLATELET # BLD AUTO: <3 K/UL (ref 150–400)
PMV BLD AUTO: ABNORMAL FL (ref 8.9–12.9)
POTASSIUM SERPL-SCNC: 3.8 MMOL/L (ref 3.5–5.1)
PROT SERPL-MCNC: 7.3 G/DL (ref 6.4–8.2)
PROTHROMBIN TIME: 10.7 SEC (ref 9–11.1)
RBC # BLD AUTO: 3.86 M/UL (ref 3.8–5.2)
RBC MORPH BLD: ABNORMAL
SODIUM SERPL-SCNC: 140 MMOL/L (ref 136–145)
SP1: NORMAL
SP2: NORMAL
SP3: NORMAL
VIT B12 SERPL-MCNC: 447 PG/ML (ref 193–986)
WBC # BLD AUTO: 4.7 K/UL (ref 3.6–11)

## 2020-02-24 PROCEDURE — 85025 COMPLETE CBC W/AUTO DIFF WBC: CPT

## 2020-02-24 PROCEDURE — 80074 ACUTE HEPATITIS PANEL: CPT

## 2020-02-24 PROCEDURE — 74011250636 HC RX REV CODE- 250/636: Performed by: INTERNAL MEDICINE

## 2020-02-24 PROCEDURE — 70450 CT HEAD/BRAIN W/O DYE: CPT

## 2020-02-24 PROCEDURE — 99284 EMERGENCY DEPT VISIT MOD MDM: CPT

## 2020-02-24 PROCEDURE — 87389 HIV-1 AG W/HIV-1&-2 AB AG IA: CPT

## 2020-02-24 PROCEDURE — 82607 VITAMIN B-12: CPT

## 2020-02-24 PROCEDURE — 74011250636 HC RX REV CODE- 250/636: Performed by: HOSPITALIST

## 2020-02-24 PROCEDURE — 82525 ASSAY OF COPPER: CPT

## 2020-02-24 PROCEDURE — 80053 COMPREHEN METABOLIC PANEL: CPT

## 2020-02-24 PROCEDURE — 74011250637 HC RX REV CODE- 250/637: Performed by: HOSPITALIST

## 2020-02-24 PROCEDURE — 82746 ASSAY OF FOLIC ACID SERUM: CPT

## 2020-02-24 PROCEDURE — 85610 PROTHROMBIN TIME: CPT

## 2020-02-24 PROCEDURE — 65270000029 HC RM PRIVATE

## 2020-02-24 PROCEDURE — 36415 COLL VENOUS BLD VENIPUNCTURE: CPT

## 2020-02-24 RX ORDER — ACETAMINOPHEN 325 MG/1
650 TABLET ORAL
COMMUNITY

## 2020-02-24 RX ORDER — ACETAMINOPHEN 325 MG/1
650 TABLET ORAL
Status: DISCONTINUED | OUTPATIENT
Start: 2020-02-24 | End: 2020-02-26 | Stop reason: HOSPADM

## 2020-02-24 RX ORDER — SODIUM CHLORIDE 9 MG/ML
250 INJECTION, SOLUTION INTRAVENOUS AS NEEDED
Status: DISCONTINUED | OUTPATIENT
Start: 2020-02-24 | End: 2020-02-26 | Stop reason: HOSPADM

## 2020-02-24 RX ORDER — ONDANSETRON 2 MG/ML
4 INJECTION INTRAMUSCULAR; INTRAVENOUS
Status: DISCONTINUED | OUTPATIENT
Start: 2020-02-24 | End: 2020-02-26 | Stop reason: HOSPADM

## 2020-02-24 RX ORDER — DEXAMETHASONE 4 MG/1
40 TABLET ORAL DAILY
Status: DISCONTINUED | OUTPATIENT
Start: 2020-02-25 | End: 2020-02-26 | Stop reason: HOSPADM

## 2020-02-24 RX ORDER — DEXAMETHASONE SODIUM PHOSPHATE 100 MG/10ML
40 INJECTION INTRAMUSCULAR; INTRAVENOUS
Status: DISCONTINUED | OUTPATIENT
Start: 2020-02-24 | End: 2020-02-24 | Stop reason: SDUPTHER

## 2020-02-24 RX ORDER — SODIUM CHLORIDE 0.9 % (FLUSH) 0.9 %
5-40 SYRINGE (ML) INJECTION EVERY 8 HOURS
Status: DISCONTINUED | OUTPATIENT
Start: 2020-02-24 | End: 2020-02-26 | Stop reason: HOSPADM

## 2020-02-24 RX ORDER — SODIUM CHLORIDE 0.9 % (FLUSH) 0.9 %
5-40 SYRINGE (ML) INJECTION AS NEEDED
Status: DISCONTINUED | OUTPATIENT
Start: 2020-02-24 | End: 2020-02-26 | Stop reason: HOSPADM

## 2020-02-24 RX ADMIN — ACETAMINOPHEN 650 MG: 325 TABLET ORAL at 20:42

## 2020-02-24 RX ADMIN — ONDANSETRON 4 MG: 2 INJECTION INTRAMUSCULAR; INTRAVENOUS at 23:27

## 2020-02-24 RX ADMIN — IMMUNE GLOBULIN INTRAVENOUS (HUMAN) 65 G: 5 INJECTION, SOLUTION INTRAVENOUS at 14:10

## 2020-02-24 RX ADMIN — Medication 10 ML: at 14:00

## 2020-02-24 RX ADMIN — Medication 5 ML: at 21:10

## 2020-02-24 NOTE — PROGRESS NOTES
Bedside shift change report given to [address] [place] [address] Ne (oncoming nurse) by Dewayne Kerr (offgoing nurse). Report included the following information SBAR, Kardex, Intake/Output, MAR and Recent Results.

## 2020-02-24 NOTE — CONSULTS
[address], [address] [place] [address] Reynold Lewis, 200 [facility]  [phone]         Hematology Consultation        Patient: Hussain Murray MRN: [id number]  SSN: [id number]    YOB: 1950  Age: 81 y.o. Sex: female        Subjective:      Hussain Murray is a 81 y.o. female who I am seeing in consultation for severe thrombocytopenia. The patient noticed a rash on her legs in the last 2 days. She also also had bleeding gums while brushing her teeth. She is also had pharyngitis x1 day. States that pain is a 7 out of 10 in severity. She denies fever, chills, headache or lightheadedness. She denies any use of blood thinners. She denies leg pain or itching. The patient was seen at her PCPs office today, and her platelet level was 6. No fever or URI symptoms. Review of Systems:    Constitutional: negative  Eyes: negative  Ears, Nose, Mouth, Throat, and Face: negative  Respiratory: negative  Cardiovascular: negative  Gastrointestinal: negative  Genitourinary:negative  Integument/Breast: negative  Hematologic/Lymphatic: negative  Musculoskeletal:negative  Neurological: negative      Past Medical History:   Diagnosis Date    Arthritis     Gastrointestinal disorder     colitis, collagenous    Hiatal hernia     Neurological disorder 2011    vertigo      Past Surgical History:   Procedure Laterality Date    HX APPENDECTOMY      HX HEENT  2016    cataract     HX ORTHOPAEDIC      bilateral TKR      History reviewed. No pertinent family history. Social History     Tobacco Use    Smoking status: Never Smoker    Smokeless tobacco: Never Used   Substance Use Topics    Alcohol use: Yes     Comment: 12 oz wine daily      Prior to Admission medications    Medication Sig Start Date End Date Taking?  Authorizing Provider   acetaminophen (TYLENOL) 325 mg tablet Take 650 mg by mouth every six (6) hours as needed for Pain. Yes Provider, Historical   multivitamin (ONE A DAY) tablet Take 1 Tab by mouth daily. Yes Provider, Historical              Allergies   Allergen Reactions    Penicillins Hives     Happened a \"long\" time ago. Pt takes Keflex prior to dental appointments without a problem.  Sulfa (Sulfonamide Antibiotics) Other (comments)     \"pupils dialate\" gantrisin    Valium [Diazepam] Other (comments)     hallucinations           Objective:     Vitals:    02/24/20 1300 02/24/20 1320 02/24/20 1348 02/24/20 1536   BP: 144/67 141/68 158/59 154/73   Pulse: 67 84 60 65   Resp: 20 25 18 18   Temp:  98.2 °F (36.8 °C) 98 °F (36.7 °C) 98.1 °F (36.7 °C)   SpO2: 97% 96% 99% 98%   Weight:       Height:                Physical Exam:    GENERAL: alert, cooperative, no distress, appears stated age  EYE: conjunctivae/corneas clear. PERRL, EOM's intact  LYMPHATIC: Cervical, supraclavicular, and axillary nodes normal.   THROAT & NECK: normal and no erythema or exudates noted. LUNG: clear to auscultation bilaterally  HEART: regular rate and rhythm, S1, S2 normal, no murmur, click, rub or gallop  ABDOMEN: soft, non-tender. Bowel sounds normal. No masses,  no organomegaly  EXTREMITIES:  extremities normal, atraumatic, no cyanosis or edema  SKIN: petechial rash on the shin. NEUROLOGIC: AOx3. Gait normal. Reflexes and motor strength normal and symmetric. Cranial nerves 2-12 and sensation grossly intact. LABS:     Lab Results   Component Value Date/Time    WBC 4.7 02/24/2020 10:45 AM    HGB 11.4 (L) 02/24/2020 10:45 AM    HCT 34.8 (L) 02/24/2020 10:45 AM    PLATELET <3 (LL) 62/87/3970 10:45 AM    MCV 90.2 02/24/2020 10:45 AM            Assessment:     1. Chronic immune thrombocytopenic purpura    I spent 65 minute with the patient in a face-to-face encounter. I explained her the disease, pathophysiology of the disease and the treatment approaches. I answered all her questions.      Start Dex PO 40 mg daily for 4 days  Start IVIG for 3 days    When platelet count rises consistently for 2-3 days, she could be d/c'd        Plan:       1. Dex 40 mg po X 4  2. IVIG 1 gm/kg for 3 days   3.  Daily CBC      Signed by: Hilton Banuelos MD                     February 24, 2020

## 2020-02-24 NOTE — PROGRESS NOTES
Pharmacy Medication Reconciliation     The patient was interviewed regarding current PTA medication list, use and drug allergies;  no one present in room and obtained permission from patient to discuss drug regimen with visitor(s) present. The patient was questioned regarding use of any other inhalers, topical products, over the counter medications, herbal medications, vitamin products or ophthalmic/nasal/otic medication use. Allergy Update: Penicillins; Sulfa (sulfonamide antibiotics); and Valium [diazepam]    Recommendations/Findings: The following amendments were made to the patient's active medication list on file at [facility]:   1) Additions: acetaminophen    2) Deletions: cholecalciferol, niacin, Zofran, Co-Q 10    3) Changes:       Pertinent Findings:     -Clarified PTA med list with patient. PTA medication list was corrected to the following:     Prior to Admission Medications   Prescriptions Last Dose Informant Taking?   acetaminophen (TYLENOL) 325 mg tablet  Self Yes   Sig: Take 650 mg by mouth every six (6) hours as needed for Pain.   multivitamin (ONE A DAY) tablet  Self Yes   Sig: Take 1 Tab by mouth daily.       Facility-Administered Medications: None          Thank you,  Jasmine Giles, PHARMD

## 2020-02-24 NOTE — ED NOTES
TRANSFER - OUT REPORT:    Verbal report given to NILESH LU on [facility]  being transferred to [facility] for routine progression of care       Report consisted of patients Situation, Background, Assessment and   Recommendations(SBAR). Information from the following report(s) SBAR, ED Summary, Recent Results, Med Rec Status and Cardiac Rhythm  was reviewed with the receiving nurse. Lines:       Opportunity for questions and clarification was provided.       Patient transported with:   [employer]

## 2020-02-24 NOTE — H&P
Hospitalist Admission Note    NAME: Roni Wilson   :  1950   MRN:  [id number]     Date/Time:  2020 11:54 AM    Patient PCP: Rani Mccracken MD  ______________________________________________________________________  Given the patient's current clinical presentation, I have a high level of concern for decompensation if discharged from the emergency department. Complex decision making was performed, which includes reviewing the patient's available past medical records, laboratory results, and x-ray films. My assessment of this patient's clinical condition and my plan of care is as follows. Assessment / Plan: Thrombocytopenia POA  Likely ITP  Petechiae lower legs above2/2     2 weeks history of petechiae-and bruising, no prior history of thrombocytopenia platelets less than 0805 on admission  -ED has talked to hematologist, plans to give IVIG and dexamethasone. -CMP negative for any liver failure or KASEY, normal mental function  -We will get peripheral smear, PT/INR, check vitamin B12, folate, HIV(she consented for checking it) and hep panel  -Follow hematologist recommendation    History of vertigo  She complains of some dizziness  We will get CT head to rule out any bleeding    History of collagenous colitis  Aware  Follows GI outpatient          Code Status: Full Code  Surrogate Decision Maker:     DVT Prophylaxis: None  GI Prophylaxis: not indicated    Baseline: ambulatory      Subjective:   CHIEF COMPLAINT: petechiae     HISTORY OF PRESENT ILLNESS:     Олег Apple is a 81 y.o.  female who presents with past medical history of collagenous colitis presented to ED with complaint of low platelet counts. She states that since past 2 weeks she has noticed petechiae on the lower extremity which has worsened and it has involves both legs and also had some bruising to the thigh because she moved some furniture. She also had a some gingival bleeding.   She went to see her primary doctor today and her platelet count was 2444. On ED her platelets were 7065. She has not taken any new medication only thing that she has taken in past few months is Tylenol occasionally. No recent fever or chills, no autoimmune disease in herself or family, no prior similar episodes. Denies any high risk sexual behaviors. We were asked to admit for work up and evaluation of the above problems. Past Medical History:   Diagnosis Date    Arthritis     Gastrointestinal disorder     colitis, collagenous    Hiatal hernia     Neurological disorder 2011    vertigo         Past Surgical History:   Procedure Laterality Date    HX APPENDECTOMY      HX HEENT  2016    cataract     HX ORTHOPAEDIC      bilateral TKR       Social History     Tobacco Use    Smoking status: Never Smoker    Smokeless tobacco: Never Used   Substance Use Topics    Alcohol use: Yes     Comment: 12 oz wine daily        History reviewed. No pertinent family history. Allergies   Allergen Reactions    Penicillins Hives    Sulfa (Sulfonamide Antibiotics) Other (comments)     \"pupils dialate\" gantrisin    Valium [Diazepam] Other (comments)     hallucinations        Prior to Admission medications    Medication Sig Start Date End Date Taking? Authorizing Provider   multivitamin (ONE A DAY) tablet Take 1 Tab by mouth daily. Provider, Historical   nicotinic acid (NIACIN) 500 mg tablet Take 500 mg by mouth Daily (before breakfast). Provider, Historical   cholecalciferol (VITAMIN D3) 1,000 unit tablet Take  by mouth daily. Provider, Historical   ubidecarenone/vitamin E mixed (COQ10  PO) Take  by mouth daily. Provider, Historical   ondansetron hcl (ZOFRAN) 4 mg tablet Take 1 Tab by mouth every eight (8) hours as needed for Nausea. 6/30/18   Merlyn Steinberg MD       REVIEW OF SYSTEMS:     I am not able to complete the review of systems because:    The patient is intubated and sedated    The patient has altered mental status due to his acute medical problems    The patient has baseline aphasia from prior stroke(s)    The patient has baseline dementia and is not reliable historian    The patient is in acute medical distress and unable to provide information           Total of 12 systems reviewed as follows:       POSITIVE= underlined text  Negative = text not underlined  General:  fever, chills, sweats, generalized weakness, weight loss/gain,      loss of appetite   Eyes:    blurred vision, eye pain, loss of vision, double vision  ENT:    rhinorrhea, pharyngitis   Respiratory:   cough, sputum production, SOB, LIZARRAGA, wheezing, pleuritic pain   Cardiology:   chest pain, palpitations, orthopnea, PND, edema, syncope   Gastrointestinal:  abdominal pain , N/V, diarrhea, dysphagia, constipation, bleeding   Genitourinary:  frequency, urgency, dysuria, hematuria, incontinence   Muskuloskeletal :  arthralgia, myalgia, back pain  Hematology:  easy bruising, nose or gum bleeding, lymphadenopathy   Dermatological: rash, ulceration, pruritis, color change / jaundice  Endocrine:   hot flashes or polydipsia   Neurological:  headache, dizziness, confusion, focal weakness, paresthesia,     Speech difficulties, memory loss, gait difficulty  Psychological: Feelings of anxiety, depression, agitation    Objective:   VITALS:    Visit Vitals  /81   Pulse 65   Temp 98.3 °F (36.8 °C)   Resp 16   Ht 5' 7\" (1.702 m)   Wt 90.6 kg (199 lb 11.8 oz)   SpO2 97%   BMI 31.28 kg/m²       PHYSICAL EXAM:    General:    Alert, cooperative, no distress, appears stated age. HEENT: Atraumatic, anicteric sclerae, pink conjunctivae  Neck:  Supple, symmetrical,  thyroid: non tender  Lungs:   Clear to auscultation bilaterally. No Wheezing or Rhonchi. No rales. Chest wall:  No tenderness  No Accessory muscle use. Heart:   Regular  rhythm,  No  murmur   No edema  Abdomen:   Soft, non-tender. Not distended.   Bowel sounds normal  Extremities: No cyanosis. No clubbing,   Skin:     Not pale has petechiae on b/l lower extremity, some some brusing on medial thigh  Psych:  Good insight. Not depressed. Not anxious or agitated. Neurologic: EOMs intact. No facial asymmetry. No aphasia or slurred speech. Symmetrical strength, Sensation grossly intact. Alert and oriented X 4.     _______________________________________________________________________  Care Plan discussed with:    Comments   Patient x    Family  x    RN     Care Manager                    Consultant:      _______________________________________________________________________  Expected  Disposition:   Home with Family x   HH/PT/OT/RN    SNF/LTC    IVETTE    ________________________________________________________________________  TOTAL TIME:  48 Minutes    Critical Care Provided     Minutes non procedure based      Comments     Reviewed previous records   >50% of visit spent in counseling and coordination of care  Discussion with patient and/or family and questions answered       ________________________________________________________________________  Signed: Cristo Miramontes MD    Procedures: see electronic medical records for all procedures/Xrays and details which were not copied into this note but were reviewed prior to creation of Plan. LAB DATA REVIEWED:    Recent Results (from the past 24 hour(s))   METABOLIC PANEL, COMPREHENSIVE    Collection Time: 02/24/20 10:45 AM   Result Value Ref Range    Sodium 140 136 - 145 mmol/L    Potassium 3.8 3.5 - 5.1 mmol/L    Chloride 109 (H) 97 - 108 mmol/L    CO2 27 21 - 32 mmol/L    Anion gap 4 (L) 5 - 15 mmol/L    Glucose 95 65 - 100 mg/dL    BUN 17 6 - 20 MG/DL    Creatinine 0.72 0.55 - 1.02 MG/DL    BUN/Creatinine ratio 24 (H) 12 - 20      GFR est AA >60 >60 ml/min/1.73m2    GFR est non-AA >60 >60 ml/min/1.73m2    Calcium 9.0 8.5 - 10.1 MG/DL    Bilirubin, total 0.4 0.2 - 1.0 MG/DL    ALT (SGPT) 22 12 - 78 U/L    AST (SGOT) 21 15 - 37 U/L    Alk. phosphatase 80 45 - 117 U/L    Protein, total 7.3 6.4 - 8.2 g/dL    Albumin 3.6 3.5 - 5.0 g/dL    Globulin 3.7 2.0 - 4.0 g/dL    A-G Ratio 1.0 (L) 1.1 - 2.2     CBC WITH AUTOMATED DIFF    Collection Time: 02/24/20 10:45 AM   Result Value Ref Range    WBC 4.7 3.6 - 11.0 K/uL    RBC 3.86 3.80 - 5.20 M/uL    HGB 11.4 (L) 11.5 - 16.0 g/dL    HCT 34.8 (L) 35.0 - 47.0 %    MCV 90.2 80.0 - 99.0 FL    MCH 29.5 26.0 - 34.0 PG    MCHC 32.8 30.0 - 36.5 g/dL    RDW 13.7 11.5 - 14.5 %    PLATELET <3 (LL) 595 - 400 K/uL    MPV ABNORMAL 8.9 - 12.9 FL    NRBC 0.0 0  WBC    ABSOLUTE NRBC 0.00 0.00 - 0.01 K/uL    NEUTROPHILS PENDING %    LYMPHOCYTES PENDING %    MONOCYTES PENDING %    EOSINOPHILS PENDING %    BASOPHILS PENDING %    IMMATURE GRANULOCYTES PENDING %    ABS. NEUTROPHILS PENDING K/UL    ABS. LYMPHOCYTES PENDING K/UL    ABS. MONOCYTES PENDING K/UL    ABS. EOSINOPHILS PENDING K/UL    ABS. BASOPHILS PENDING K/UL    ABS. IMM. GRANS.  PENDING K/UL    DF PENDING

## 2020-02-24 NOTE — ED PROVIDER NOTES
EMERGENCY DEPARTMENT HISTORY AND PHYSICAL EXAM      Date: 2/24/2020  Patient Name: Bobby Aguilar    History of Presenting Illness     Chief Complaint   Patient presents with    Other     Patient states she was seen at PCP and had blood work done, pt states PCP told pt to come to ER d/t abnormal platelet levels. Pt states she also has a sore throat, and has bruising on legs       History Provided By: Patient and Notes    HPI: Bobby Aguilar, 81 y.o. female presents to the ED with cc of low platelets. The patient noticed a rash on her legs in the last 2 days. She also had bleeding gums while brushing her teeth. She has also had pharyngitis x1 day. She states that pain is a 7 out of 10 in severity. She denies fever, chills, headache or lightheadedness. She denies any use of blood thinners. She denies leg pain or itching. The patient was seen at her PCPs office today, and her platelet level was 6. There are no other complaints, changes, or physical findings at this time. PCP: Loretta Zuñiga MD    No current facility-administered medications on file prior to encounter. Current Outpatient Medications on File Prior to Encounter   Medication Sig Dispense Refill    acetaminophen (TYLENOL) 325 mg tablet Take 650 mg by mouth every six (6) hours as needed for Pain.  multivitamin (ONE A DAY) tablet Take 1 Tab by mouth daily.  [DISCONTINUED] nicotinic acid (NIACIN) 500 mg tablet Take 500 mg by mouth Daily (before breakfast).  [DISCONTINUED] cholecalciferol (VITAMIN D3) 1,000 unit tablet Take  by mouth daily.  [DISCONTINUED] ubidecarenone/vitamin E mixed (COQ10  PO) Take  by mouth daily.  [DISCONTINUED] ondansetron hcl (ZOFRAN) 4 mg tablet Take 1 Tab by mouth every eight (8) hours as needed for Nausea.  20 Tab 0       Past History     Past Medical History:  Past Medical History:   Diagnosis Date    Arthritis     Gastrointestinal disorder     colitis, collagenous    Hiatal hernia     Neurological disorder 2011    vertigo        Past Surgical History:  Past Surgical History:   Procedure Laterality Date    HX APPENDECTOMY      HX HEENT  2016    cataract     HX ORTHOPAEDIC      bilateral TKR       Family History:  History reviewed. No pertinent family history. Social History:  Social History     Tobacco Use    Smoking status: Never Smoker    Smokeless tobacco: Never Used   Substance Use Topics    Alcohol use: Yes     Comment: 12 oz wine daily    Drug use: No       Allergies: Allergies   Allergen Reactions    Penicillins Hives     Happened a \"long\" time ago. Pt takes Keflex prior to dental appointments without a problem.  Sulfa (Sulfonamide Antibiotics) Other (comments)     \"pupils dialate\" gantrisin    Valium [Diazepam] Other (comments)     hallucinations         Review of Systems   Review of Systems   Constitutional: Negative for chills and fever. HENT: Positive for sore throat. Negative for congestion. Eyes: Negative. Respiratory: Negative for shortness of breath. Gastrointestinal: Negative for abdominal pain. Endocrine: Negative for heat intolerance. Genitourinary: Negative. Musculoskeletal: Negative for back pain. Skin: Positive for rash. Allergic/Immunologic: Negative for immunocompromised state. Neurological: Negative for dizziness. Hematological: Does not bruise/bleed easily. Psychiatric/Behavioral: Negative. All other systems reviewed and are negative. Physical Exam   Physical Exam  Vitals signs and nursing note reviewed. Constitutional:       General: She is not in acute distress. Appearance: She is well-developed. HENT:      Head: Normocephalic. Mouth/Throat:      Comments: Contusion upper palate  Neck:      Musculoskeletal: Normal range of motion and neck supple. Cardiovascular:      Rate and Rhythm: Normal rate and regular rhythm. Heart sounds: Normal heart sounds.    Pulmonary:      Effort: Pulmonary effort is normal.      Breath sounds: Normal breath sounds. Abdominal:      General: Bowel sounds are normal.      Palpations: Abdomen is soft. Tenderness: There is no abdominal tenderness. Musculoskeletal: Normal range of motion. Skin:     General: Skin is warm and dry. Findings: Bruising and rash present. Comments: Bruising noted to left arm, petechiae noted diffusely   Neurological:      General: No focal deficit present. Mental Status: She is alert and oriented to person, place, and time. Psychiatric:         Mood and Affect: Mood normal.         Behavior: Behavior normal.         Diagnostic Study Results     Labs -     Recent Results (from the past 12 hour(s))   METABOLIC PANEL, COMPREHENSIVE    Collection Time: 02/24/20 10:45 AM   Result Value Ref Range    Sodium 140 136 - 145 mmol/L    Potassium 3.8 3.5 - 5.1 mmol/L    Chloride 109 (H) 97 - 108 mmol/L    CO2 27 21 - 32 mmol/L    Anion gap 4 (L) 5 - 15 mmol/L    Glucose 95 65 - 100 mg/dL    BUN 17 6 - 20 MG/DL    Creatinine 0.72 0.55 - 1.02 MG/DL    BUN/Creatinine ratio 24 (H) 12 - 20      GFR est AA >60 >60 ml/min/1.73m2    GFR est non-AA >60 >60 ml/min/1.73m2    Calcium 9.0 8.5 - 10.1 MG/DL    Bilirubin, total 0.4 0.2 - 1.0 MG/DL    ALT (SGPT) 22 12 - 78 U/L    AST (SGOT) 21 15 - 37 U/L    Alk.  phosphatase 80 45 - 117 U/L    Protein, total 7.3 6.4 - 8.2 g/dL    Albumin 3.6 3.5 - 5.0 g/dL    Globulin 3.7 2.0 - 4.0 g/dL    A-G Ratio 1.0 (L) 1.1 - 2.2     CBC WITH AUTOMATED DIFF    Collection Time: 02/24/20 10:45 AM   Result Value Ref Range    WBC 4.7 3.6 - 11.0 K/uL    RBC 3.86 3.80 - 5.20 M/uL    HGB 11.4 (L) 11.5 - 16.0 g/dL    HCT 34.8 (L) 35.0 - 47.0 %    MCV 90.2 80.0 - 99.0 FL    MCH 29.5 26.0 - 34.0 PG    MCHC 32.8 30.0 - 36.5 g/dL    RDW 13.7 11.5 - 14.5 %    PLATELET <3 (LL) 599 - 400 K/uL    MPV ABNORMAL 8.9 - 12.9 FL    NRBC 0.0 0  WBC    ABSOLUTE NRBC 0.00 0.00 - 0.01 K/uL    NEUTROPHILS 70 32 - 75 % LYMPHOCYTES 27 12 - 49 %    MONOCYTES 2 (L) 5 - 13 %    EOSINOPHILS 1 0 - 7 %    BASOPHILS 0 0 - 1 %    IMMATURE GRANULOCYTES 0 0.0 - 0.5 %    ABS. NEUTROPHILS 3.3 1.8 - 8.0 K/UL    ABS. LYMPHOCYTES 1.3 0.8 - 3.5 K/UL    ABS. MONOCYTES 0.1 0.0 - 1.0 K/UL    ABS. EOSINOPHILS 0.0 0.0 - 0.4 K/UL    ABS. BASOPHILS 0.0 0.0 - 0.1 K/UL    ABS. IMM. GRANS. 0.0 0.00 - 0.04 K/UL    DF AUTOMATED      RBC COMMENTS NORMOCYTIC, NORMOCHROMIC     PROTHROMBIN TIME + INR    Collection Time: 02/24/20 12:35 PM   Result Value Ref Range    INR 1.0 0.9 - 1.1      Prothrombin time 10.7 9.0 - 11.1 sec       Radiologic Studies -   CT HEAD WO CONT   Final Result   IMPRESSION: No Intracranial Disease Evident on Head CT. CT Results  (Last 48 hours)               02/24/20 1222  CT HEAD WO CONT Final result    Impression:  IMPRESSION: No Intracranial Disease Evident on Head CT. Narrative:  INDICATION: Dizziness       EXAM: CT HEAD without contrast.    CT dose reduction was achieved through use of a standardized protocol tailored   for this examination and automatic exposure control for dose modulation. FINDINGS: Unenhanced CT Head is performed. The brain parenchyma is unremarkable   in appearance for age, without evidence for infarct. There is no bleed, mass,   shift, hydrocephalus or extra-axial fluid collection. Bone windows are   unremarkable. CXR Results  (Last 48 hours)    None          Medical Decision Making   I am the first provider for this patient. I reviewed the vital signs, available nursing notes, past medical history, past surgical history, family history and social history. Vital Signs-Reviewed the patient's vital signs.   Patient Vitals for the past 12 hrs:   Temp Pulse Resp BP SpO2   02/24/20 1320 98.2 °F (36.8 °C) 84 25 141/68 96 %   02/24/20 1300  67 20 144/67 97 %   02/24/20 1009  65 16  97 %   02/24/20 1008  65 15  97 %   02/24/20 1000  65 19 157/81 96 %   02/24/20 0950  63 22 174/75 97 %   02/24/20 0942  70 13  97 %   02/24/20 0937    161/86    02/24/20 0922 98.3 °F (36.8 °C) 69 16 (!) 188/91 100 %           Records Reviewed: Nursing Notes and Old Medical Records    Provider Notes (Medical Decision Making): Thrombocytopenia, pharyngitis    ED Course:   Initial assessment performed. The patients presenting problems have been discussed, and they are in agreement with the care plan formulated and outlined with them. I have encouraged them to ask questions as they arise throughout their visit. Consult note:    I discussed the case with Dr. Antonio Og, oncology. He recommends Decadron 40 mg p.o., IVIG and admission. He will provide a consult    Consult note:    Patient is being admitted by Dr. Maurizio Hyman, hospitalist                                                    Critical Care Time:   0    Disposition:  admit    PLAN:  1. Current Discharge Medication List        2. Follow-up Information    None       Return to ED if worse     Diagnosis     Clinical Impression:   1. Thrombocytopenia ([address]        Attestations:    Mai Galeazzi, MD    Please note that this dictation was completed with [employer], the computer voice recognition software. Quite often unanticipated grammatical, syntax, homophones, and other interpretive errors are inadvertently transcribed by the computer software. Please disregard these errors. Please excuse any errors that have escaped final proofreading. Thank you.

## 2020-02-25 LAB
ANION GAP SERPL CALC-SCNC: 4 MMOL/L (ref 5–15)
BASOPHILS # BLD: 0.1 K/UL (ref 0–0.1)
BASOPHILS NFR BLD: 1 % (ref 0–1)
BUN SERPL-MCNC: 12 MG/DL (ref 6–20)
BUN/CREAT SERPL: 18 (ref 12–20)
CALCIUM SERPL-MCNC: 8.8 MG/DL (ref 8.5–10.1)
CHLORIDE SERPL-SCNC: 110 MMOL/L (ref 97–108)
CO2 SERPL-SCNC: 26 MMOL/L (ref 21–32)
CREAT SERPL-MCNC: 0.68 MG/DL (ref 0.55–1.02)
DIFFERENTIAL METHOD BLD: ABNORMAL
EOSINOPHIL # BLD: 0.2 K/UL (ref 0–0.4)
EOSINOPHIL NFR BLD: 3 % (ref 0–7)
ERYTHROCYTE [DISTWIDTH] IN BLOOD BY AUTOMATED COUNT: 13.5 % (ref 11.5–14.5)
GLUCOSE SERPL-MCNC: 94 MG/DL (ref 65–100)
HCT VFR BLD AUTO: 33.7 % (ref 35–47)
HGB BLD-MCNC: 11 G/DL (ref 11.5–16)
IMM GRANULOCYTES # BLD AUTO: 0 K/UL (ref 0–0.04)
IMM GRANULOCYTES NFR BLD AUTO: 0 % (ref 0–0.5)
LYMPHOCYTES # BLD: 1.6 K/UL (ref 0.8–3.5)
LYMPHOCYTES NFR BLD: 32 % (ref 12–49)
MAGNESIUM SERPL-MCNC: 2.2 MG/DL (ref 1.6–2.4)
MCH RBC QN AUTO: 29.2 PG (ref 26–34)
MCHC RBC AUTO-ENTMCNC: 32.6 G/DL (ref 30–36.5)
MCV RBC AUTO: 89.4 FL (ref 80–99)
MONOCYTES # BLD: 0.4 K/UL (ref 0–1)
MONOCYTES NFR BLD: 8 % (ref 5–13)
NEUTS SEG # BLD: 2.7 K/UL (ref 1.8–8)
NEUTS SEG NFR BLD: 56 % (ref 32–75)
NRBC # BLD: 0 K/UL (ref 0–0.01)
NRBC BLD-RTO: 0 PER 100 WBC
PLATELET # BLD AUTO: 7 K/UL (ref 150–400)
PMV BLD AUTO: ABNORMAL FL (ref 8.9–12.9)
POTASSIUM SERPL-SCNC: 3.8 MMOL/L (ref 3.5–5.1)
RBC # BLD AUTO: 3.77 M/UL (ref 3.8–5.2)
RBC MORPH BLD: ABNORMAL
SODIUM SERPL-SCNC: 140 MMOL/L (ref 136–145)
WBC # BLD AUTO: 5 K/UL (ref 3.6–11)

## 2020-02-25 PROCEDURE — 83735 ASSAY OF MAGNESIUM: CPT

## 2020-02-25 PROCEDURE — 85025 COMPLETE CBC W/AUTO DIFF WBC: CPT

## 2020-02-25 PROCEDURE — 80048 BASIC METABOLIC PNL TOTAL CA: CPT

## 2020-02-25 PROCEDURE — 65270000029 HC RM PRIVATE

## 2020-02-25 PROCEDURE — 36415 COLL VENOUS BLD VENIPUNCTURE: CPT

## 2020-02-25 PROCEDURE — 74011250636 HC RX REV CODE- 250/636: Performed by: INTERNAL MEDICINE

## 2020-02-25 PROCEDURE — 74011250637 HC RX REV CODE- 250/637: Performed by: HOSPITALIST

## 2020-02-25 RX ADMIN — IMMUNE GLOBULIN INTRAVENOUS (HUMAN) 65 G: 5 INJECTION, SOLUTION INTRAVENOUS at 14:14

## 2020-02-25 RX ADMIN — ACETAMINOPHEN 650 MG: 325 TABLET ORAL at 11:15

## 2020-02-25 RX ADMIN — Medication 10 ML: at 21:13

## 2020-02-25 RX ADMIN — Medication 5 ML: at 05:19

## 2020-02-25 RX ADMIN — Medication 10 ML: at 14:18

## 2020-02-25 RX ADMIN — DEXAMETHASONE 40 MG: 4 TABLET ORAL at 12:44

## 2020-02-25 RX ADMIN — ACETAMINOPHEN 650 MG: 325 TABLET ORAL at 15:54

## 2020-02-25 RX ADMIN — ACETAMINOPHEN 650 MG: 325 TABLET ORAL at 20:30

## 2020-02-25 NOTE — PROGRESS NOTES
[address], [address] [place] [address] Reynold Lewis, [address] [place]  [phone]         Hematology Progress note        Patient: Leanna Shook MRN: [id number]  SSN: [id number]    YOB: 1950  Age: 81 y.o. Sex: female        Subjective:      Leanna Shook is a 81 y.o. female who I see for severe thrombocytopenia. The patient noticed a rash on her legs in the last 2 days. She also also had bleeding gums while brushing her teeth. She is also had pharyngitis x1 day. States that pain is a 7 out of 10 in severity. She denies fever, chills, headache or lightheadedness. She denies any use of blood thinners. She denies leg pain or itching. The patient was seen at her PCPs office today, and her platelet level was 6. No fever or URI symptoms. Interval History    Ms. Scout Tovar platelets are 7 today after 1 dose of IVIG and Dexamethasone. No bleeding of the gums or epistaxis. Review of Systems:    Constitutional: negative  Eyes: negative  Ears, Nose, Mouth, Throat, and Face: negative  Respiratory: negative  Cardiovascular: negative  Gastrointestinal: negative  Genitourinary:negative  Integument/Breast: negative  Hematologic/Lymphatic: negative  Musculoskeletal:negative  Neurological: negative      Past Medical History:   Diagnosis Date    Arthritis     Gastrointestinal disorder     colitis, collagenous    Hiatal hernia     Neurological disorder 2011    vertigo      Past Surgical History:   Procedure Laterality Date    HX APPENDECTOMY      HX HEENT  2016    cataract     HX ORTHOPAEDIC      bilateral TKR      History reviewed. No pertinent family history.   Social History     Tobacco Use    Smoking status: Never Smoker    Smokeless tobacco: Never Used   Substance Use Topics    Alcohol use: Yes     Comment: 12 oz wine daily      Prior to Admission medications    Medication Sig Start Date End Date Taking? Authorizing Provider   acetaminophen (TYLENOL) 325 mg tablet Take 650 mg by mouth every six (6) hours as needed for Pain. Yes Provider, Historical   multivitamin (ONE A DAY) tablet Take 1 Tab by mouth daily. Yes Provider, Historical              Allergies   Allergen Reactions    Penicillins Hives     Happened a \"long\" time ago. Pt takes Keflex prior to dental appointments without a problem.  Sulfa (Sulfonamide Antibiotics) Other (comments)     \"pupils dialate\" gantrisin    Valium [Diazepam] Other (comments)     hallucinations           Objective:     Vitals:    02/25/20 0750 02/25/20 0856 02/25/20 1516 02/25/20 1537   BP: 129/57  146/70 153/77   Pulse: 79  70 75   Resp: 17  16 18   Temp: 98.8 °F (37.1 °C)   96.4 °F (35.8 °C)   SpO2: 97% 97% 95% 95%   Weight:       Height:                Physical Exam:    GENERAL: alert, cooperative, no distress, appears stated age  EYE: conjunctivae/corneas clear. PERRL, EOM's intact  LYMPHATIC: Cervical, supraclavicular, and axillary nodes normal.   THROAT & NECK: normal and no erythema or exudates noted. LUNG: clear to auscultation bilaterally  HEART: regular rate and rhythm, S1, S2 normal, no murmur, click, rub or gallop  ABDOMEN: soft, non-tender. Bowel sounds normal. No masses,  no organomegaly  EXTREMITIES:  extremities normal, atraumatic, no cyanosis or edema  SKIN: petechial rash on the shin. NEUROLOGIC: AOx3. Gait normal. Reflexes and motor strength normal and symmetric. Cranial nerves 2-12 and sensation grossly intact. LABS:     Lab Results   Component Value Date/Time    WBC 5.0 02/25/2020 03:57 AM    HGB 11.0 (L) 02/25/2020 03:57 AM    HCT 33.7 (L) 02/25/2020 03:57 AM    PLATELET 7 (LL) 68/00/7577 03:57 AM    MCV 89.4 02/25/2020 03:57 AM            Assessment:     1.  Chronic immune thrombocytopenic purpura    Platelets - 7 today    Start Dex PO 40 mg daily for 4 days  Start IVIG for 3 days    When platelet count rises consistently for 2-3 days, she could be d/c'd        Plan:       1. Dex 40 mg po X 4 - Day 2   2. IVIG 1 gm/kg for 3 days   3.  Daily CBC      Signed by: Wilber Archibald NP                     February 25, 2020

## 2020-02-25 NOTE — PROGRESS NOTES
Hospitalist Progress Note           2020  2:34 PM                       Patient:  Brina Winkler  PCP:  Louisa Arambula MD  Date of admission:  2020    80 yo female with PMhx of Collagenous colitis admitted for petechiae on legs and arm along with easy bruising noticed over past week. Pt went to PCP who checks her CBC and noted platelets of 9440 and was sent to ER.      Assessment & Plan  ITP  - Plt improved from 3 to 7K  - c/w IVIG X 3 doses  - C/w Decadron  - no transfusion unless obvious bleeding   - Heme following     Headache  -  C/w Tylenol PRN  - d/w RN, if worsening headache or neuro symptoms, to inform me immediately    VTE prophylaxis: SCDs  Follow-up labs/studies: AM CBC  Discussed plan of care with Patient/Family and Nurse   Disposition:  Anticipate discharge to HOME  In 48 hours       Subjective  Pt seen and examined  Doing well  No new issues   No hematuria, melena    Review of systems otherwise as above     Physical examination  Visit Vitals  /57 (BP 1 Location: Left arm, BP Patient Position: At rest)   Pulse 79   Temp 98.8 °F (37.1 °C)   Resp 17   Ht 5' 7\" (1.702 m)   Wt 90.6 kg (199 lb 11.8 oz)   SpO2 97%   BMI 31.28 kg/m²      Temp (24hrs), Av.3 °F (36.8 °C), Min:98.1 °F (36.7 °C), Max:98.8 °F (37.1 °C)         O2 Device: Room air  Visit Vitals  /57 (BP 1 Location: Left arm, BP Patient Position: At rest)   Pulse 79   Temp 98.8 °F (37.1 °C)   Resp 17   Ht 5' 7\" (1.702 m)   Wt 90.6 kg (199 lb 11.8 oz)   SpO2 97%   BMI 31.28 kg/m²    O2 Device: Room air  Visit Vitals  /57 (BP 1 Location: Left arm, BP Patient Position: At rest)   Pulse 79   Temp 98.8 °F (37.1 °C)   Resp 17   Ht 5' 7\" (1.702 m)   Wt 90.6 kg (199 lb 11.8 oz)   SpO2 97%   BMI 31.28 kg/m²       No intake or output data in the 24 hours ending 02/25/20 1434  Last shift:    No intake/output data recorded. Last 3 shifts:    No intake/output data recorded. General:   Alert, cooperative, no acute distress   Head:   No obvious abnormalities, atraumatic   Eyes:   Conjunctivae clear   Oropharynx:  Oral mucosa normal, Petechiae on tongue and superior palate    Chest wall:    No tenderness or deformities    Lungs:   Clear to auscultation bilaterally    Heart:   Regular rhythm, no murmur   Abdomen:    Soft, non-tender    Bowel sounds normal    No masses or organomegaly    Extremities:  No edema or DVT signs   Pulses:  Symmetric all extremities   Skin:  Warm and dry    +Petechaie on b/l LE and UE, +Ecchymosis of inner thighs and arms   Neurologic:  Oriented x3NONE   No focal deficits   Urinary catheter:  Deferred     Data review      Recent Labs     02/25/20  0357 02/24/20  1045   WBC 5.0 4.7   HGB 11.0* 11.4*   HCT 33.7* 34.8*   PLT 7* <3*     Recent Labs     02/25/20  0357 02/24/20  1235 02/24/20  1045     --  140   K 3.8  --  3.8   *  --  109*   CO2 26  --  27   GLU 94  --  95   BUN 12  --  17   CREA 0.68  --  0.72   CA 8.8  --  9.0   MG 2.2  --   --    ALB  --   --  3.6   TBILI  --   --  0.4   SGOT  --   --  21   ALT  --   --  22   INR  --  1.0  --      Current Facility-Administered Medications   Medication Dose Route Frequency    0.9% sodium chloride infusion 250 mL  250 mL IntraVENous PRN    immune globulin 10% (FLEBOGAMMA DIF) infusion 65 g  65 g IntraVENous Q24H    dexAMETHasone (DECADRON) tablet 40 mg  40 mg Oral DAILY    sodium chloride (NS) flush 5-40 mL  5-40 mL IntraVENous Q8H    sodium chloride (NS) flush 5-40 mL  5-40 mL IntraVENous PRN    acetaminophen (TYLENOL) tablet 650 mg  650 mg Oral Q4H PRN    ondansetron (ZOFRAN) injection 4 mg  4 mg IntraVENous Q8H PRN     Total time spent managing care of the patient: 35 minutes.        Emiliana Meeks MD   --Hospitalist, Internal Medicine

## 2020-02-25 NOTE — PROGRESS NOTES
Problem: Falls - Risk of  Goal: *Absence of Falls  Description  Document Chiki Santillan Fall Risk and appropriate interventions in the flowsheet.   Outcome: Progressing Towards Goal  Note: Fall Risk Interventions:

## 2020-02-25 NOTE — PROGRESS NOTES
Care Manager Initial Assessment    SUSU:   Home with follow up appointments (New appointment with Hematologist)   Pt will need 2nd IMM Medicare letter at discharge    to transport home at discharge      Reason for Admission: Thrombocytopenia                   RUR Score:   7%  Low risk for readmission                  Plan for utilizing home health:      No current need for [facility] identified at this time    PCP: First and Last name:  Marley Khan MD   Name of Practice: [address] [place] [address]     Are you a current patient: Yes/No  Yes   Approximate date of last visit:  2/24/2020                    Current Advanced Directive/Advance Care Plan:  Pt reports that she has an AMD, but it is not on file with [facility]                         Transition of Care Plan:   Home with follow up services                   Pt is a 81year old female admitted to [facility] on 2/24/2020 for a diagnosis of Thrombocytopenia. Pt alert and oriented and verifies demographics. Pt's , Leelee Qureshi ([phone]) present in room. Pt lives in a 1 level home (no steps, one ramp) with her . Pt independent with ADLs, no ambulation needs, works and drives. Pt has all DME at the home, but does not have to use it. Pt has been to [facility] for IPR and outpatient services. Pt has used HH in the past.    Pt's PCP is Dr. Marley Khan MD.  She has Medicare A & B with a secondary insurance. Pt uses [facility] in [place]. Pt plans to return home at discharge with follow up with PCP and will need new Hematologist.    CM to follow and assist with any identified discharge needs. Care Management Interventions  PCP Verified by CM: Yes  Last Visit to PCP: 02/24/20  Mode of Transport at Discharge: Other (see comment)  Confirm Follow Up Transport: Self  Discharge Location  Discharge Placement: Home with outpatient services    [address] [facility], [address] - [facility]  Advanced Steps ACP Facilitator  Zone Phone: [phone]      Mobile: [phone]

## 2020-02-25 NOTE — PROGRESS NOTES
Bedside and Verbal shift change report given to Indira Harris (oncoming nurse) by Adwoa Tan RN (offgoing nurse). Report included the following information Kardex, MAR and Recent Results.

## 2020-02-26 VITALS
RESPIRATION RATE: 16 BRPM | DIASTOLIC BLOOD PRESSURE: 61 MMHG | HEART RATE: 66 BPM | BODY MASS INDEX: 31.35 KG/M2 | WEIGHT: 199.74 LBS | OXYGEN SATURATION: 99 % | SYSTOLIC BLOOD PRESSURE: 139 MMHG | HEIGHT: 67 IN | TEMPERATURE: 97.6 F

## 2020-02-26 LAB
BASOPHILS # BLD: 0 K/UL (ref 0–0.1)
BASOPHILS NFR BLD: 0 % (ref 0–1)
COPPER SERPL-MCNC: 95 UG/DL (ref 72–166)
DIFFERENTIAL METHOD BLD: ABNORMAL
EOSINOPHIL # BLD: 0 K/UL (ref 0–0.4)
EOSINOPHIL NFR BLD: 0 % (ref 0–7)
ERYTHROCYTE [DISTWIDTH] IN BLOOD BY AUTOMATED COUNT: 13.2 % (ref 11.5–14.5)
HCT VFR BLD AUTO: 33.2 % (ref 35–47)
HGB BLD-MCNC: 11.1 G/DL (ref 11.5–16)
IMM GRANULOCYTES # BLD AUTO: 0 K/UL (ref 0–0.04)
IMM GRANULOCYTES NFR BLD AUTO: 0 % (ref 0–0.5)
LYMPHOCYTES # BLD: 0.9 K/UL (ref 0.8–3.5)
LYMPHOCYTES NFR BLD: 16 % (ref 12–49)
MCH RBC QN AUTO: 29.8 PG (ref 26–34)
MCHC RBC AUTO-ENTMCNC: 33.4 G/DL (ref 30–36.5)
MCV RBC AUTO: 89.2 FL (ref 80–99)
MONOCYTES # BLD: 0.1 K/UL (ref 0–1)
MONOCYTES NFR BLD: 1 % (ref 5–13)
NEUTS SEG # BLD: 4.9 K/UL (ref 1.8–8)
NEUTS SEG NFR BLD: 83 % (ref 32–75)
NRBC # BLD: 0 K/UL (ref 0–0.01)
NRBC BLD-RTO: 0 PER 100 WBC
PLATELET # BLD AUTO: 33 K/UL (ref 150–400)
PLATELET COMMENTS,PCOM: ABNORMAL
PMV BLD AUTO: 12.9 FL (ref 8.9–12.9)
RBC # BLD AUTO: 3.72 M/UL (ref 3.8–5.2)
RBC MORPH BLD: ABNORMAL
WBC # BLD AUTO: 5.9 K/UL (ref 3.6–11)

## 2020-02-26 PROCEDURE — 85025 COMPLETE CBC W/AUTO DIFF WBC: CPT

## 2020-02-26 PROCEDURE — 74011250637 HC RX REV CODE- 250/637: Performed by: HOSPITALIST

## 2020-02-26 PROCEDURE — 36415 COLL VENOUS BLD VENIPUNCTURE: CPT

## 2020-02-26 PROCEDURE — 74011250636 HC RX REV CODE- 250/636: Performed by: INTERNAL MEDICINE

## 2020-02-26 RX ORDER — DEXAMETHASONE 4 MG/1
40 TABLET ORAL
Qty: 10 TAB | Refills: 0 | Status: SHIPPED | OUTPATIENT
Start: 2020-02-26

## 2020-02-26 RX ADMIN — DEXAMETHASONE 40 MG: 4 TABLET ORAL at 08:30

## 2020-02-26 RX ADMIN — ACETAMINOPHEN 650 MG: 325 TABLET ORAL at 00:28

## 2020-02-26 RX ADMIN — Medication 10 ML: at 13:38

## 2020-02-26 RX ADMIN — IMMUNE GLOBULIN INTRAVENOUS (HUMAN) 65 G: 5 INJECTION, SOLUTION INTRAVENOUS at 13:31

## 2020-02-26 RX ADMIN — ACETAMINOPHEN 650 MG: 325 TABLET ORAL at 05:32

## 2020-02-26 RX ADMIN — ACETAMINOPHEN 650 MG: 325 TABLET ORAL at 08:40

## 2020-02-26 RX ADMIN — ACETAMINOPHEN 650 MG: 325 TABLET ORAL at 12:07

## 2020-02-26 RX ADMIN — Medication 10 ML: at 05:34

## 2020-02-26 RX ADMIN — ACETAMINOPHEN 650 MG: 325 TABLET ORAL at 16:38

## 2020-02-26 NOTE — PROGRESS NOTES
Pt discharge home with family. IV removed prior to d/c. Instructions and follow up appointments reviewed.

## 2020-02-26 NOTE — PROGRESS NOTES
Bedside shift change report given to Cally (oncoming nurse) by Patricio Henry (offgoing nurse). Report included the following information SBAR, Kardex, Intake/Output, MAR and Recent Results. Pt with tooth ache, medicated three times for this pain.

## 2020-02-26 NOTE — DISCHARGE INSTRUCTIONS
You have an appointment at 10 am on Monday March 2nd at the outpatient infusion center. They are located in [address] [place] [address]. [address]. Phone number [phone]. Please bring this form with you to show your primary care provider at your follow-up appointment. Primary care provider:  Dr. Matti Ramos MD    Discharging provider:  Mckenzie Adams MD    You have been admitted to the hospital with the following diagnoses:  · Thrombocytopenia ([address] [D69.6]    FOLLOW-UP CARE RECOMMENDATIONS:    APPOINTMENTS:  Follow-up Information     Follow up With Specialties Details Why Contact Info    Matti Ramos MD Family Practice In 3 weeks Discharge follow up  [place] [id number]  [phone]          Future Appointments   Date Time Provider Stephen Sagastume   3/2/2020 10:00 AM [place] PHLEBOTOMIST [address] recommended:   - repeat Labs on 3/2/20 at 10AM       PENDING TEST RESULTS:  At the time of your discharge the following test results are still pending: NONE  Please make sure you review these results with your outpatient follow-up provider(s). SYMPTOMS to watch for: chest pain, shortness of breath, fever, chills, nausea, vomiting, diarrhea, change in mentation, falling, weakness, bleeding. DIET/what to eat:  Regular Diet    ACTIVITY:  Activity as tolerated    WOUND CARE: NONE    EQUIPMENT needed:  NONE        What to do if new or unexpected symptoms occur? If you experience any of the above symptoms (or should other concerns or questions arise after discharge) please call your primary care physician. Return to the emergency room if you cannot get hold of your doctor. · It is very important that you keep your follow-up appointment(s). · Please bring discharge papers, medication list (and/or medication bottles) to your follow-up appointments for review by your outpatient provider(s).   · Please check the list of medications and be sure it includes every medication (even non-prescription medications) that your provider wants you to take. · It is important that you take the medication exactly as they are prescribed. · Keep your medication in the bottles provided by the pharmacist and keep a list of the medication names, dosages, and times to be taken in your wallet. · Do not take other medications without consulting your doctor. · If you have any questions about your medications or other instructions, please talk to your nurse or care provider before you leave the hospital.    I understand that if any problems occur once I am at home I am to contact my physician. These instructions were explained to me and I had the opportunity to ask questions. Patient Education        Immune Thrombocytopenic Purpura: Care Instructions  Your Care Instructions    Immune thrombocytopenic purpura, or ITP, is a blood problem. It happens when your immune system does not work as it should and destroys platelets in your blood. Platelets are a kind of cell in your blood. They have a sticky surface that helps them form clots to stop bleeding. Your blood can't clot if you don't have enough platelets. This causes abnormal bleeding. Bleeding can get worse over time, or it can come on fast.  To treat ITP, you may need to take medicines to help stop the destruction of platelets. You may need platelets added to your blood. Or you may need surgery to remove your spleen. Your spleen's job is to remove platelets from your body. So taking out the spleen helps increase your platelet count. Follow-up care is a key part of your treatment and safety. Be sure to make and go to all appointments, and call your doctor if you are having problems. It's also a good idea to know your test results and keep a list of the medicines you take. How can you care for yourself at home? · Be safe with medicines. Take your medicines exactly as prescribed.  Call your doctor if you think you are having a problem with your medicine. · Before you start any new over-the-counter or prescribed medicine, tell your doctor if:  ? You have had a bad reaction to any medicines in the past.  ? You take other medicines, such as over-the-counter medicines, vitamins, or herbal supplements. ? You have other health problems. ? You are or could be pregnant. · Do not take aspirin or other anti-inflammatory medicines (such as ibuprofen or naproxen) without first talking to your doctor. Ask your doctor if it is okay to use acetaminophen (Tylenol). · Do not take two or more pain medicines at the same time unless the doctor told you to. Many pain medicines have acetaminophen, which is Tylenol. Too much acetaminophen (Tylenol) can be harmful. · Get plenty of rest.  · \"Think safety\" and protect yourself from injury. Do not lift anything heavy. · Do not donate blood. · Do not drink alcohol or use illegal drugs. When should you call for help? Call 911 anytime you think you may need emergency care. For example, call if:    · You passed out (lost consciousness).     · You have signs of severe bleeding, which includes:  ? You have a severe headache that is different from past headaches. ? You vomit blood or what looks like coffee grounds. ? Your stools are maroon or very bloody.    Call your doctor now or seek immediate medical care if:    · You are dizzy or lightheaded, or you feel like you may faint.     · You have abnormal bleeding, such as:  ? A nosebleed that you can't easily stop. ? Your stools are black and look like tar, or they have streaks of blood. ? You have blood in your urine. ? You have joint pain. ? You have bruises or blood spots under your skin.    Watch closely for changes in your health, and be sure to contact your doctor if:    · You do not get better as expected. Where can you learn more? Go to http://jerri-lianna.info/.   Enter G746 in the search box to learn more about \"Immune Thrombocytopenic Purpura: Care Instructions. \"  Current as of: March 28, 2019  Content Version: 12.2  © 1926-0989 [employer]. Care instructions adapted under license by [employer] (which disclaims liability or warranty for this information). If you have questions about a medical condition or this instruction, always ask your healthcare professional. [address] any warranty or liability for your use of this information.

## 2020-02-26 NOTE — PROGRESS NOTES
Bedside shift change report given to Eh Mace (oncoming nurse) by Ken Carter RN   (offgoing nurse). Report included the following information SBAR, Kardex, ED Summary, [facility] and Recent Results.

## 2020-02-26 NOTE — DISCHARGE SUMMARY
Called and left a voicemail for Tatyana (mom) to please return call to schedule Kurtis's overnight sleep study.    Abisai Thomas  Sleep Clinic Specialist - [place]     Discharge Summary     Patient:  Wallace Kelley       MRN: [id number]       YOB: 1950       Age: 81 y.o. Date of admission:  2/24/2020    Date of discharge:  2/26/2020    Primary care provider: Dr. Rody Plunkett MD    Admitting provider:  Sapna Rogers MD    Discharging provider:  Mera Shaver MD - [phone]  If unavailable, call [phone] and ask the  to page the triage hospitalist.    Consultations  · IP CONSULT TO HEMATOLOGY  · IP CONSULT TO HOSPITALIST    Procedures  · * No surgery found *    Discharge destination: HOME. The patient is stable for discharge. Admission diagnosis  · Thrombocytopenia ([facility] [address] [D69.6]    Current Discharge Medication List      START taking these medications    Details   dexAMETHasone (DECADRON) 4 mg tablet Take 40 mg by mouth Daily (before breakfast). Take on 2/27/20  Qty: 10 Tab, Refills: 0         CONTINUE these medications which have NOT CHANGED    Details   acetaminophen (TYLENOL) 325 mg tablet Take 650 mg by mouth every six (6) hours as needed for Pain.      multivitamin (ONE A DAY) tablet Take 1 Tab by mouth daily. Follow-up Information     Follow up With Specialties Details Why Contact Info    Rody Plunkett MD Family Practice In 3 weeks Discharge follow up  [place] [id number] [phone]            Final discharge diagnoses and brief hospital course  Please also refer to the admission H&P for details on the presenting problem. 82 yo female with PMhx of Collagenous colitis admitted for petechiae on legs and arm along with easy bruising noticed over past week.  Pt went to PCP who checks her CBC and noted platelets of 0182 and was sent to ER.      ITP  - Plt improved from 3  To 33K  - s/p IVIG X 3 doses  - C/w Decadron 40mg X 1 dose tomorrow to complete 4 doses  - no transfusion unless obvious bleeding   - Heme following , outpatient labs on 3/2/20     Headache  -  C/w Tylenol PRN      FOLLOW-UP TESTS recommended:   - repeat Labs on 3/2/20 at 10AM         PENDING TEST RESULTS:  At the time of your discharge the following test results are still pending: NONE  Please make sure you review these results with your outpatient follow-up provider(s).     SYMPTOMS to watch for: chest pain, shortness of breath, fever, chills, nausea, vomiting, diarrhea, change in mentation, falling, weakness, bleeding.     DIET/what to eat:  Regular Diet     ACTIVITY:  Activity as tolerated     WOUND CARE: NONE     EQUIPMENT needed:  NONE       Physical examination at discharge  Visit Vitals  /61 (BP 1 Location: Right arm, BP Patient Position: At rest)   Pulse 66   Temp 97.6 °F (36.4 °C)   Resp 16   Ht 5' 7\" (1.702 m)   Wt 90.6 kg (199 lb 11.8 oz)   SpO2 99%   BMI 31.28 kg/m²     Ao3  PERRLA  Lung Clear  CVS: RRR  Abd;soft NT ND   Ext: no edema   Skin: petechiae on LE and UE    Pertinent imaging studies:      Recent Labs     02/26/20  0029 02/25/20  0357 02/24/20  1045   WBC 5.9 5.0 4.7   HGB 11.1* 11.0* 11.4*   HCT 33.2* 33.7* 34.8*   PLT 33* 7* <3*     Recent Labs     02/25/20  0357 02/24/20  1045    140   K 3.8 3.8   * 109*   CO2 26 27   BUN 12 17   CREA 0.68 0.72   GLU 94 95   CA 8.8 9.0   MG 2.2  --      Recent Labs     02/24/20  1045   SGOT 21   AP 80   TP 7.3   ALB 3.6   GLOB 3.7     Recent Labs     02/24/20  1235   INR 1.0   PTP 10.7      No results for input(s): FE, TIBC, PSAT, FERR in the last 72 hours. No results for input(s): PH, PCO2, PO2 in the last 72 hours. No results for input(s): CPK, CKMB in the last 72 hours.     No lab exists for component: TROPONINI  No components found for: [place]    Chronic Diagnoses:    Problem List as of 2/26/2020 Date Reviewed: 7/15/2018          Codes Class Noted - Resolved    Thrombocytopenia ([address] ICD-10-CM: D69.6  ICD-9-CM: 287.5  2/24/2020 - Present        Chronic ITP (idiopathic thrombocytopenia) ([facility] [address] ICD-10-CM: D69.3  ICD-9-CM: 287.31  2/24/2020 - Present        LIZARRAGA (dyspnea on exertion) ICD-10-CM: R06.09  ICD-9-CM: 786.09  7/16/2018 - Present        Vertigo ICD-10-CM: R42  ICD-9-CM: 780.4  7/15/2018 - Present        Dizziness ICD-10-CM: R42  ICD-9-CM: 780.4  7/15/2018 - Present        Generalized OA ICD-10-CM: M15.9  ICD-9-CM: 715.00  7/15/2018 - Present        Colitis, collagenous ICD-10-CM: G82.428  ICD-9-CM: 558.9  7/15/2018 - Present        Heart murmur ICD-10-CM: R01.1  ICD-9-CM: 785.2  7/15/2018 - Present              Time spent on discharge related activities today greater than 30 minutes. Signed:  Reyna Almaraz MD                 Hospitalist, Internal Medicine      Cc:  Daniel Marcum MD

## 2020-02-26 NOTE — PROGRESS NOTES
[address], [address] [place] [address] Ne, [address] [facility]  [phone]         Hematology Progress note        Patient: Irma Doe MRN: [id number]  SSN: [id number]    YOB: 1950  Age: 81 y.o. Sex: female        Subjective:      Irma Doe is a 81 y.o. female who I see for severe thrombocytopenia. The patient noticed a rash on her legs in the last 2 days. She also also had bleeding gums while brushing her teeth. She is also had pharyngitis x1 day. States that pain is a 7 out of 10 in severity. She denies fever, chills, headache or lightheadedness. She denies any use of blood thinners. She denies leg pain or itching. The patient was seen at her PCPs office today, and her platelet level was 6. No fever or URI symptoms. Interval History    Ms. Villa Castaneda platelets are 33 today after 2 doses of IVIG and Dexamethasone. No bleeding of the gums or epistaxis. She is being discharged this evening after her 3rd dose of IVIG. Review of Systems:    Constitutional: negative  Eyes: negative  Ears, Nose, Mouth, Throat, and Face: negative  Respiratory: negative  Cardiovascular: negative  Gastrointestinal: negative  Genitourinary:negative  Integument/Breast: petechiae improving on lower extremities   Hematologic/Lymphatic: negative  Musculoskeletal:negative  Neurological: negative      Past Medical History:   Diagnosis Date    Arthritis     Gastrointestinal disorder     colitis, collagenous    Hiatal hernia     Neurological disorder 2011    vertigo      Past Surgical History:   Procedure Laterality Date    HX APPENDECTOMY      HX HEENT  2016    cataract     HX ORTHOPAEDIC      bilateral TKR      History reviewed. No pertinent family history. Social History     Tobacco Use    Smoking status: Never Smoker    Smokeless tobacco: Never Used   Substance Use Topics    Alcohol use:  Yes Comment: 12 oz wine daily      Prior to Admission medications    Medication Sig Start Date End Date Taking? Authorizing Provider   acetaminophen (TYLENOL) 325 mg tablet Take 650 mg by mouth every six (6) hours as needed for Pain. Yes Provider, Historical   multivitamin (ONE A DAY) tablet Take 1 Tab by mouth daily. Yes Provider, Historical              Allergies   Allergen Reactions    Penicillins Hives     Happened a \"long\" time ago. Pt takes Keflex prior to dental appointments without a problem.  Sulfa (Sulfonamide Antibiotics) Other (comments)     \"pupils dialate\" gantrisin    Valium [Diazepam] Other (comments)     hallucinations           Objective:     Vitals:    02/25/20 1537 02/25/20 2236 02/26/20 0826 02/26/20 1028   BP: 153/77 144/72 142/68 139/61   Pulse: 75 70 70 66   Resp: 18 18  16   Temp: 96.4 °F (35.8 °C) 98.6 °F (37 °C) 98.1 °F (36.7 °C) 97.6 °F (36.4 °C)   SpO2: 95% 96% 98% 99%   Weight:       Height:                Physical Exam:    GENERAL: alert, cooperative, no distress, appears stated age  EYE: conjunctivae/corneas clear. PERRL, EOM's intact  LYMPHATIC: Cervical, supraclavicular, and axillary nodes normal.   THROAT & NECK: normal and no erythema or exudates noted. LUNG: clear to auscultation bilaterally  HEART: regular rate and rhythm, S1, S2 normal, no murmur, click, rub or gallop  ABDOMEN: soft, non-tender. Bowel sounds normal. No masses,  no organomegaly  EXTREMITIES:  extremities normal, atraumatic, no cyanosis or edema  SKIN: petechial rash on the shin. NEUROLOGIC: AOx3. Gait normal. Reflexes and motor strength normal and symmetric. Cranial nerves 2-12 and sensation grossly intact. LABS:     Lab Results   Component Value Date/Time    WBC 5.9 02/26/2020 12:29 AM    HGB 11.1 (L) 02/26/2020 12:29 AM    HCT 33.2 (L) 02/26/2020 12:29 AM    PLATELET 33 (LL) 91/37/7540 12:29 AM    MCV 89.2 02/26/2020 12:29 AM       Assessment:     1.  Chronic immune thrombocytopenic purpura    Platelets - 33 today    Receiving Dex PO 40 mg daily for 4 days - day 3   IVIG for 3 days - today is day 3       Plan:       1. Dex 40 mg po X 4 - Day 3  2. IVIG 1 gm/kg for 3 days - today is day 3  3. Repeat CBC Monday at the [facility]. 4. May be discharged home this evening after IVIG.   5. She will have repeat labs done weekly at her PCP and we will follow up with her in 1 month - 3/27/2020      Signed by: Silvina Lama NP                     February 26, 2020

## 2020-03-02 ENCOUNTER — HOSPITAL ENCOUNTER (OUTPATIENT)
Dept: INFUSION THERAPY | Age: 70
Discharge: HOME | End: 2020-03-02
Payer: MEDICARE

## 2020-03-02 VITALS
WEIGHT: 199 LBS | SYSTOLIC BLOOD PRESSURE: 145 MMHG | TEMPERATURE: 97.8 F | OXYGEN SATURATION: 98 % | RESPIRATION RATE: 18 BRPM | DIASTOLIC BLOOD PRESSURE: 76 MMHG | HEART RATE: 81 BPM | BODY MASS INDEX: 31.17 KG/M2

## 2020-03-02 LAB
BASOPHILS # BLD: 0 K/UL (ref 0–0.1)
BASOPHILS NFR BLD: 1 % (ref 0–1)
DIFFERENTIAL METHOD BLD: ABNORMAL
EOSINOPHIL # BLD: 0.1 K/UL (ref 0–0.4)
EOSINOPHIL NFR BLD: 2 % (ref 0–7)
ERYTHROCYTE [DISTWIDTH] IN BLOOD BY AUTOMATED COUNT: 13.6 % (ref 11.5–14.5)
HCT VFR BLD AUTO: 38 % (ref 35–47)
HGB BLD-MCNC: 12.7 G/DL (ref 11.5–16)
IMM GRANULOCYTES # BLD AUTO: 0.1 K/UL (ref 0–0.04)
IMM GRANULOCYTES NFR BLD AUTO: 1 % (ref 0–0.5)
LYMPHOCYTES # BLD: 1.8 K/UL (ref 0.8–3.5)
LYMPHOCYTES NFR BLD: 28 % (ref 12–49)
MCH RBC QN AUTO: 29.9 PG (ref 26–34)
MCHC RBC AUTO-ENTMCNC: 33.4 G/DL (ref 30–36.5)
MCV RBC AUTO: 89.4 FL (ref 80–99)
MONOCYTES # BLD: 0.6 K/UL (ref 0–1)
MONOCYTES NFR BLD: 10 % (ref 5–13)
NEUTS SEG # BLD: 3.6 K/UL (ref 1.8–8)
NEUTS SEG NFR BLD: 58 % (ref 32–75)
NRBC # BLD: 0 K/UL (ref 0–0.01)
NRBC BLD-RTO: 0 PER 100 WBC
PLATELET # BLD AUTO: 79 K/UL (ref 150–400)
PMV BLD AUTO: 11.1 FL (ref 8.9–12.9)
RBC # BLD AUTO: 4.25 M/UL (ref 3.8–5.2)
WBC # BLD AUTO: 6.2 K/UL (ref 3.6–11)

## 2020-03-02 PROCEDURE — 36415 COLL VENOUS BLD VENIPUNCTURE: CPT

## 2020-03-02 PROCEDURE — 85025 COMPLETE CBC W/AUTO DIFF WBC: CPT

## 2020-03-02 NOTE — PROGRESS NOTES
[address] [facility] Lab Draw Note:  Arrived - [address] [facility]    Visit Vitals  /76 (BP 1 Location: Left arm, BP Patient Position: Sitting)   Pulse 81   Temp 97.8 °F (36.6 °C)   Resp 18   Wt 90.3 kg (199 lb)   SpO2 98%   BMI 31.17 kg/m²       Labs drawn peripherally from ac of R arm -   Pt left at 0945 - Tolerated well. Pt denies any acute problems/changes. Discharged from [facility] ambulatory. No distress. See [facility] for pending lab results.

## 2020-03-17 ENCOUNTER — HOSPITAL ENCOUNTER (OUTPATIENT)
Dept: INFUSION THERAPY | Age: 70
Discharge: HOME | End: 2020-03-17
Payer: MEDICARE

## 2020-03-17 VITALS
HEART RATE: 68 BPM | BODY MASS INDEX: 31.22 KG/M2 | RESPIRATION RATE: 18 BRPM | SYSTOLIC BLOOD PRESSURE: 142 MMHG | HEIGHT: 67 IN | OXYGEN SATURATION: 98 % | DIASTOLIC BLOOD PRESSURE: 69 MMHG | TEMPERATURE: 98.2 F | WEIGHT: 198.9 LBS

## 2020-03-17 LAB
BASOPHILS # BLD: 0 K/UL (ref 0–0.1)
BASOPHILS NFR BLD: 1 % (ref 0–1)
DIFFERENTIAL METHOD BLD: ABNORMAL
EOSINOPHIL # BLD: 0.2 K/UL (ref 0–0.4)
EOSINOPHIL NFR BLD: 3 % (ref 0–7)
ERYTHROCYTE [DISTWIDTH] IN BLOOD BY AUTOMATED COUNT: 13.5 % (ref 11.5–14.5)
HCT VFR BLD AUTO: 36.9 % (ref 35–47)
HGB BLD-MCNC: 12.1 G/DL (ref 11.5–16)
IMM GRANULOCYTES # BLD AUTO: 0 K/UL (ref 0–0.04)
IMM GRANULOCYTES NFR BLD AUTO: 0 % (ref 0–0.5)
LYMPHOCYTES # BLD: 1.8 K/UL (ref 0.8–3.5)
LYMPHOCYTES NFR BLD: 36 % (ref 12–49)
MCH RBC QN AUTO: 29.7 PG (ref 26–34)
MCHC RBC AUTO-ENTMCNC: 32.8 G/DL (ref 30–36.5)
MCV RBC AUTO: 90.7 FL (ref 80–99)
MONOCYTES # BLD: 0.5 K/UL (ref 0–1)
MONOCYTES NFR BLD: 10 % (ref 5–13)
NEUTS SEG # BLD: 2.5 K/UL (ref 1.8–8)
NEUTS SEG NFR BLD: 50 % (ref 32–75)
NRBC # BLD: 0 K/UL (ref 0–0.01)
NRBC BLD-RTO: 0 PER 100 WBC
PLATELET # BLD AUTO: 80 K/UL (ref 150–400)
PMV BLD AUTO: 11.2 FL (ref 8.9–12.9)
RBC # BLD AUTO: 4.07 M/UL (ref 3.8–5.2)
WBC # BLD AUTO: 5.1 K/UL (ref 3.6–11)

## 2020-03-17 PROCEDURE — 36415 COLL VENOUS BLD VENIPUNCTURE: CPT

## 2020-03-17 PROCEDURE — 85025 COMPLETE CBC W/AUTO DIFF WBC: CPT

## 2020-03-17 NOTE — PROGRESS NOTES
[address] [facility] Lab Draw Note:  Arrived - 2853    Visit Vitals  /69 (BP 1 Location: Left arm)   Pulse 68   Temp 98.2 °F (36.8 °C)   Resp 18   Ht 5' 7\" (1.702 m)   Wt 90.2 kg (198 lb 14.4 oz)   SpO2 98%   BMI 31.15 kg/m²       Labs drawn peripherally from R AC. Recent Results (from the past 12 hour(s))   CBC WITH AUTOMATED DIFF    Collection Time: 03/17/20 10:25 AM   Result Value Ref Range    WBC 5.1 3.6 - 11.0 K/uL    RBC 4.07 3.80 - 5.20 M/uL    HGB 12.1 11.5 - 16.0 g/dL    HCT 36.9 35.0 - 47.0 %    MCV 90.7 80.0 - 99.0 FL    MCH 29.7 26.0 - 34.0 PG    MCHC 32.8 30.0 - 36.5 g/dL    RDW 13.5 11.5 - 14.5 %    PLATELET 80 (L) 739 - 400 K/uL    MPV 11.2 8.9 - 12.9 FL    NRBC 0.0 0  WBC    ABSOLUTE NRBC 0.00 0.00 - 0.01 K/uL    NEUTROPHILS 50 32 - 75 %    LYMPHOCYTES 36 12 - 49 %    MONOCYTES 10 5 - 13 %    EOSINOPHILS 3 0 - 7 %    BASOPHILS 1 0 - 1 %    IMMATURE GRANULOCYTES 0 0.0 - 0.5 %    ABS. NEUTROPHILS 2.5 1.8 - 8.0 K/UL    ABS. LYMPHOCYTES 1.8 0.8 - 3.5 K/UL    ABS. MONOCYTES 0.5 0.0 - 1.0 K/UL    ABS. EOSINOPHILS 0.2 0.0 - 0.4 K/UL    ABS. BASOPHILS 0.0 0.0 - 0.1 K/UL    ABS. IMM. GRANS. 0.0 0.00 - 0.04 K/UL    DF AUTOMATED         1025 - Tolerated well. Pt denies any acute problems/changes. Discharged from [facility] ambulatory. No distress. No further appts scheduled at this time.

## 2020-04-02 ENCOUNTER — DOCUMENTATION ONLY (OUTPATIENT)
Dept: ONCOLOGY | Age: 70
End: 2020-04-02

## 2020-04-02 ENCOUNTER — TELEPHONE (OUTPATIENT)
Dept: ONCOLOGY | Age: 70
End: 2020-04-02

## 2020-04-02 NOTE — PROGRESS NOTES
Pt had blood work done at her PCP on Friday and requested results be sent. We do not have this. This nurse called PCP office, left VM. Awaiting a call and fax.

## 2021-01-18 ENCOUNTER — TRANSCRIBE ORDER (OUTPATIENT)
Dept: SCHEDULING | Age: 71
End: 2021-01-18

## 2021-01-18 DIAGNOSIS — Z12.31 VISIT FOR SCREENING MAMMOGRAM: Primary | ICD-10-CM

## 2021-04-23 ENCOUNTER — HOSPITAL ENCOUNTER (OUTPATIENT)
Dept: MAMMOGRAPHY | Age: 71
Discharge: HOME | End: 2021-04-23
Attending: FAMILY MEDICINE
Payer: MEDICARE

## 2021-04-23 DIAGNOSIS — Z12.31 VISIT FOR SCREENING MAMMOGRAM: ICD-10-CM

## 2021-04-23 PROCEDURE — 77067 SCR MAMMO BI INCL CAD: CPT

## 2021-08-03 PROBLEM — R42 DIZZINESS: Status: RESOLVED | Noted: 2018-07-15 | Resolved: 2021-08-03

## 2022-03-18 PROBLEM — R01.1 HEART MURMUR: Status: ACTIVE | Noted: 2018-07-15

## 2022-03-19 PROBLEM — M15.9 GENERALIZED OA: Status: ACTIVE | Noted: 2018-07-15

## 2022-03-19 PROBLEM — K52.831 COLITIS, COLLAGENOUS: Status: ACTIVE | Noted: 2018-07-15

## 2022-03-19 PROBLEM — R06.09 DOE (DYSPNEA ON EXERTION): Status: ACTIVE | Noted: 2018-07-16

## 2022-03-19 PROBLEM — R42 VERTIGO: Status: ACTIVE | Noted: 2018-07-15

## 2022-03-19 PROBLEM — D69.3 CHRONIC ITP (IDIOPATHIC THROMBOCYTOPENIA) (HCC): Status: ACTIVE | Noted: 2020-02-24

## 2022-03-20 PROBLEM — D69.6 THROMBOCYTOPENIA (HCC): Status: ACTIVE | Noted: 2020-02-24

## 2022-12-19 ENCOUNTER — HOSPITAL ENCOUNTER (OUTPATIENT)
Age: 72
Setting detail: OUTPATIENT SURGERY
Discharge: HOME | End: 2022-12-19
Attending: INTERNAL MEDICINE | Admitting: INTERNAL MEDICINE
Payer: MEDICARE

## 2022-12-19 ENCOUNTER — ANESTHESIA EVENT (OUTPATIENT)
Dept: ENDOSCOPY | Age: 72
End: 2022-12-19
Payer: MEDICARE

## 2022-12-19 ENCOUNTER — ANESTHESIA (OUTPATIENT)
Dept: ENDOSCOPY | Age: 72
End: 2022-12-19
Payer: MEDICARE

## 2022-12-19 VITALS
WEIGHT: 225 LBS | HEART RATE: 80 BPM | HEIGHT: 67 IN | OXYGEN SATURATION: 98 % | BODY MASS INDEX: 35.31 KG/M2 | RESPIRATION RATE: 21 BRPM | DIASTOLIC BLOOD PRESSURE: 70 MMHG | SYSTOLIC BLOOD PRESSURE: 138 MMHG

## 2022-12-19 PROCEDURE — 74011250636 HC RX REV CODE- 250/636: Performed by: NURSE ANESTHETIST, CERTIFIED REGISTERED

## 2022-12-19 PROCEDURE — 76040000019: Performed by: INTERNAL MEDICINE

## 2022-12-19 PROCEDURE — 88305 TISSUE EXAM BY PATHOLOGIST: CPT

## 2022-12-19 PROCEDURE — 2709999900 HC NON-CHARGEABLE SUPPLY: Performed by: INTERNAL MEDICINE

## 2022-12-19 PROCEDURE — 76060000031 HC ANESTHESIA FIRST 0.5 HR: Performed by: INTERNAL MEDICINE

## 2022-12-19 PROCEDURE — 77030021593 HC FCPS BIOP ENDOSC BSC -A: Performed by: INTERNAL MEDICINE

## 2022-12-19 PROCEDURE — 74011000250 HC RX REV CODE- 250: Performed by: NURSE ANESTHETIST, CERTIFIED REGISTERED

## 2022-12-19 RX ORDER — LIDOCAINE HYDROCHLORIDE 20 MG/ML
INJECTION, SOLUTION EPIDURAL; INFILTRATION; INTRACAUDAL; PERINEURAL AS NEEDED
Status: DISCONTINUED | OUTPATIENT
Start: 2022-12-19 | End: 2022-12-19 | Stop reason: HOSPADM

## 2022-12-19 RX ORDER — SODIUM CHLORIDE 0.9 % (FLUSH) 0.9 %
5-40 SYRINGE (ML) INJECTION EVERY 8 HOURS
Status: DISCONTINUED | OUTPATIENT
Start: 2022-12-19 | End: 2022-12-19 | Stop reason: HOSPADM

## 2022-12-19 RX ORDER — SODIUM CHLORIDE 0.9 % (FLUSH) 0.9 %
5-40 SYRINGE (ML) INJECTION AS NEEDED
Status: DISCONTINUED | OUTPATIENT
Start: 2022-12-19 | End: 2022-12-19 | Stop reason: HOSPADM

## 2022-12-19 RX ORDER — ATROPINE SULFATE 0.1 MG/ML
0.5 INJECTION INTRAVENOUS
Status: DISCONTINUED | OUTPATIENT
Start: 2022-12-19 | End: 2022-12-19 | Stop reason: HOSPADM

## 2022-12-19 RX ORDER — NALOXONE HYDROCHLORIDE 0.4 MG/ML
0.4 INJECTION, SOLUTION INTRAMUSCULAR; INTRAVENOUS; SUBCUTANEOUS
Status: DISCONTINUED | OUTPATIENT
Start: 2022-12-19 | End: 2022-12-19 | Stop reason: HOSPADM

## 2022-12-19 RX ORDER — SODIUM CHLORIDE 9 MG/ML
50 INJECTION, SOLUTION INTRAVENOUS CONTINUOUS
Status: DISCONTINUED | OUTPATIENT
Start: 2022-12-19 | End: 2022-12-19 | Stop reason: HOSPADM

## 2022-12-19 RX ORDER — FLUMAZENIL 0.1 MG/ML
0.2 INJECTION INTRAVENOUS
Status: DISCONTINUED | OUTPATIENT
Start: 2022-12-19 | End: 2022-12-19 | Stop reason: HOSPADM

## 2022-12-19 RX ORDER — FAMOTIDINE 40 MG/1
40 TABLET, FILM COATED ORAL DAILY
COMMUNITY

## 2022-12-19 RX ORDER — EPINEPHRINE 0.1 MG/ML
1 INJECTION INTRACARDIAC; INTRAVENOUS
Status: DISCONTINUED | OUTPATIENT
Start: 2022-12-19 | End: 2022-12-19 | Stop reason: HOSPADM

## 2022-12-19 RX ORDER — PROPOFOL 10 MG/ML
INJECTION, EMULSION INTRAVENOUS AS NEEDED
Status: DISCONTINUED | OUTPATIENT
Start: 2022-12-19 | End: 2022-12-19 | Stop reason: HOSPADM

## 2022-12-19 RX ORDER — DEXTROMETHORPHAN/PSEUDOEPHED 2.5-7.5/.8
1.2 DROPS ORAL
Status: DISCONTINUED | OUTPATIENT
Start: 2022-12-19 | End: 2022-12-19 | Stop reason: HOSPADM

## 2022-12-19 RX ORDER — SODIUM CHLORIDE 9 MG/ML
INJECTION, SOLUTION INTRAVENOUS
Status: DISCONTINUED | OUTPATIENT
Start: 2022-12-19 | End: 2022-12-19 | Stop reason: HOSPADM

## 2022-12-19 RX ADMIN — PROPOFOL 50 MG: 10 INJECTION, EMULSION INTRAVENOUS at 08:59

## 2022-12-19 RX ADMIN — PROPOFOL 30 MG: 10 INJECTION, EMULSION INTRAVENOUS at 09:02

## 2022-12-19 RX ADMIN — PROPOFOL 30 MG: 10 INJECTION, EMULSION INTRAVENOUS at 08:55

## 2022-12-19 RX ADMIN — SODIUM CHLORIDE: 900 INJECTION, SOLUTION INTRAVENOUS at 08:30

## 2022-12-19 RX ADMIN — PROPOFOL 70 MG: 10 INJECTION, EMULSION INTRAVENOUS at 08:49

## 2022-12-19 RX ADMIN — PROPOFOL 30 MG: 10 INJECTION, EMULSION INTRAVENOUS at 08:54

## 2022-12-19 RX ADMIN — PROPOFOL 50 MG: 10 INJECTION, EMULSION INTRAVENOUS at 08:52

## 2022-12-19 RX ADMIN — PROPOFOL 40 MG: 10 INJECTION, EMULSION INTRAVENOUS at 09:05

## 2022-12-19 RX ADMIN — PROPOFOL 50 MG: 10 INJECTION, EMULSION INTRAVENOUS at 08:50

## 2022-12-19 RX ADMIN — LIDOCAINE HYDROCHLORIDE 100 MG: 20 INJECTION, SOLUTION EPIDURAL; INFILTRATION; INTRACAUDAL; PERINEURAL at 08:49

## 2022-12-19 NOTE — DISCHARGE INSTRUCTIONS
[address] [facility] Colten Burnett MD  [phone]      December 19, 2022    Galina Gomez  YOB: 1950    COLONOSCOPY DISCHARGE INSTRUCTIONS    If there is redness at IV site you should apply warm compress to area. If redness or soreness persist contact Dr. Jeffery Burnett or your primary care doctor. There may be a slight amount of blood passed from the rectum. Gaseous discomfort may develop, but walking, belching will help relieve this. You may not operate a vehicle for 12 hours  You may not operate machinery or dangerous appliances for rest of today  You may not drink alcoholic beverages for 12 hours  Avoid making any critical decisions for 24 hours    DIET:  You may resume your normal diet, but some patients find that heavy or large meals may lead to indigestion or vomiting. I suggest a light meal as first food intake. MEDICATIONS:  The use of some over-the-counter pain medication may lead to bleeding after colon biopsies or polyp removal.  Tylenol (also called acetaminophen) is safe to take even if you have had colonoscopy with polyp removal.  Based on the procedure you can resume baby-dose aspirin (81 mg) and may safely take anticoagulation (like apixaban (Eliquis), dabigatran (Pradaxa), edoxaban (Deliah Juan Luis), rivaroxaban (Xarelto) or warfarin (Coumadin)) or antiplatelet medications (high dose aspirin 325mg, Clopidogrel (Plavix), Prasugrel (Effient), Ticagrelor (Brilinta))for the next two (48 hours) days. ACTIVITY:  You may resume your normal household activities, but it is recommended that you spend the remainder of the day resting -  avoid any strenuous activity. CALL DR. BAEZ'S OFFICE IF:  Increasing pain, nausea, vomiting  Abdominal distension (swelling)  Significant new or increased bleeding (oral or rectal)  Fever/Chills  Chest pain/shortness of breath                       Additional instructions: Unremarkable examination of the esophagus, stomach, and duodenum (first part of the small intestine). Random biopsies were obtained from the stomach and duodenum. Colon lining appeared to be slightly abnormal and may suggest persistent microscopic colitis. Biopsies were obtained; our office will contact you within 2 weeks for further instructions when we receive final biopsy report from the stomach, small intestine, and colon. No colon polyps or cancer identified. It was an honor to be your doctor today. Please let me or my office staff know if you have any feedback about today's procedure. Rome Anderson MD, December 19, 2022    Colonoscopy saves lives, and can prevent colon cancer. Everyone aged 48 or older needs colonoscopy.   Tell your family and friends: get the test!

## 2022-12-19 NOTE — ANESTHESIA POSTPROCEDURE EVALUATION
Procedure(s):  COLONOSCOPY, ESOPHAGOGASTRODUODENOSCOPY (EGD)  ESOPHAGOGASTRODUODENOSCOPY (EGD)  ESOPHAGOGASTRODUODENAL (EGD) BIOPSY  COLON BIOPSY. MAC    Anesthesia Post Evaluation        Patient location during evaluation: PACU  Patient participation: complete - patient participated  Level of consciousness: awake and alert  Pain management: adequate  Airway patency: patent  Anesthetic complications: no  Cardiovascular status: acceptable  Respiratory status: acceptable  Hydration status: acceptable  Comments: I have seen and evaluated the patient and is ready for discharge. Lynn Govea MD    Post anesthesia nausea and vomiting:  none      INITIAL Post-op Vital signs:   Vitals Value Taken Time   /82 12/19/22 0925   Temp     Pulse 80 12/19/22 0929   Resp 21 12/19/22 0929   SpO2 98 % 12/19/22 0929   Vitals shown include unvalidated device data.

## 2022-12-19 NOTE — PROCEDURES
[address] [place]   [address] [facility] [address], [address]  [phone]                           Colonoscopy and EGD Procedure Note      Indications:    Diarrhea     :  Fely Ruth MD    Staff: Endoscopy Technician-1: Cecil Anguiano  Endoscopy RN-1: Shanika Weston RN    Referring Provider: Es De Santiago MD    Sedation:  MAC    Procedure Details:  After informed consent was obtained with all risks and benefits of procedure explained and pre-operative exam completed, pt was placed in the left lateral decubitus position. Following sequential administration of sedation as per above, the gastroscope was inserted into the mouth and advanced under direct vision to second portion of the duodenum. A careful inspection was made as the gastroscope was withdrawn, including a retroflexed view of the proximal stomach; findings and interventions are described below. EGD Findings:  Esophagus: normal  Stomach: normal; random biopsies obtained from antrum and body to exclude H pylori  Duodenum:normal; random biopsies obtained from duodenal bulb and second portion to exclude enteropathy    The bed was then turned and upon sequential sedation as per above, a digital rectal exam was performed per below. The Olympus videocolonoscope was inserted in the rectum and carefully advanced to the terminal ileum. The quality of preparation was excellent. [place] Bowel Prep Score  3/3/3/9. The colonoscope was slowly withdrawn with careful evaluation between folds. Retroflexion in the rectum was performed. Colon Findings: Diffuse mild nodularity and edema of the colon lining. Random biopsies were obtained from the colon to evaluate for microscopic colitis. No ulcers, no polyps, no masses.   Rectum: otherwise unremarkable  Sigmoid: otherwise unremarkable  Descending Colon: otherwise unremarkable   Transverse Colon: otherwise unremarkable  Ascending Colon: otherwise unremarkable  Cecum: otherwise unremarkable  Terminal Ileum: normal; random biopsies obtained to evaluate for ileopathy           Specimens Removed:    ID Type Source Tests Collected by Time Destination   1 : Duodenal bx Preservative   Wilmer MD Marianne 12/19/2022 0280 Pathology   2 : Random gastric bx Preservative   Wilmer MD Marianne 12/19/2022 5167 Pathology   3 : Random Terminal Ileum bx Preservative   Wilmer Marianne, MD 12/19/2022 0901 Pathology   4 : Random colon bx Preservative   Wilmer Marianne, MD 12/19/2022 0965 Pathology       Complications: None. EBL:  none    Impression:    See Postoperative diagnosis above    Recommendations:   - Await pathology evaluation of biopsy / resected tissue; suspect persistent microscopic colitis  - Resume normal medications. - Resume previous diet. Discharge Disposition:  Home in the company of a  when able to ambulate.     Yari Hinton MD  12/19/2022  9:11 AM

## 2022-12-19 NOTE — ANESTHESIA PREPROCEDURE EVALUATION
Relevant Problems   No relevant active problems       Anesthetic History   No history of anesthetic complications            Review of Systems / Medical History  Patient summary reviewed, nursing notes reviewed and pertinent labs reviewed    Pulmonary  Within defined limits                 Neuro/Psych   Within defined limits           Cardiovascular  Within defined limits                     GI/Hepatic/Renal  Within defined limits              Endo/Other  Within defined limits      Arthritis     Other Findings   Comments: Alpha-gal ITP           Physical Exam    Airway  Mallampati: II  TM Distance: > 6 cm  Neck ROM: normal range of motion   Mouth opening: Normal     Cardiovascular  Regular rate and rhythm,  S1 and S2 normal,  no murmur, click, rub, or gallop             Dental  No notable dental hx       Pulmonary  Breath sounds clear to auscultation               Abdominal  GI exam deferred       Other Findings            Anesthetic Plan    ASA: 2  Anesthesia type: MAC            Anesthetic plan and risks discussed with: Patient

## 2022-12-19 NOTE — H&P
Pre-Endoscopy H&P   Chief complaint: abdominal symptoms and diarrhea    HPI:  Patient presents for procedure. The indication for the procedure, the patient's history and the patient's current medications are reviewed prior to the procedure and that data is reported on the endoscopy note in the chart to which this document is attached. Any significant complaints with regard to organ systems will be noted, and if not mentioned then a review of systems is not contributory. Past Medical History:   Diagnosis Date    Allergic reaction to alpha-gal     Arthritis     Gastrointestinal disorder     colitis, collagenous    Hiatal hernia     Idiopathic thrombocytopenic purpura (ITP) ([facility])     Neurological disorder 2011    vertigo       Past Surgical History:   Procedure Laterality Date    HX APPENDECTOMY      HX CARPAL TUNNEL RELEASE Bilateral     HX HEENT  2016    cataract     HX ORTHOPAEDIC      bilateral TKR     Social   Social History     Tobacco Use    Smoking status: Never    Smokeless tobacco: Never   Substance Use Topics    Alcohol use: Yes     Comment: 12 oz wine daily      History reviewed. No pertinent family history. Allergies   Allergen Reactions    Penicillins Hives     Happened a \"long\" time ago. Pt takes Keflex prior to dental appointments without a problem. Sulfa (Sulfonamide Antibiotics) Other (comments)     \"pupils dialate\" gantrisin    Valium [Diazepam] Other (comments)     hallucinations      Prior to Admission Medications   Prescriptions Last Dose Informant Patient Reported? Taking?   acetaminophen (TYLENOL) 325 mg tablet 12/12/2022 Self Yes Yes   Sig: Take 650 mg by mouth every six (6) hours as needed for Pain.   famotidine (PEPCID) 40 mg tablet 12/12/2022  Yes Yes   Sig: Take 40 mg by mouth daily. Facility-Administered Medications: None       PHYSICAL EXAM:  The patient is examined immediately prior to the procedure.   Visit Vitals  Ht 5' 7\" (1.702 m)   Wt 102.1 kg (225 lb)   Breastfeeding No   BMI 35.24 kg/m²     Gen: Appears comfortable, no distress. Pulm: comfortable respirations with no abnormal audible breath sounds  CV: heart regular, well perfused  GI: abdomen flat. ASSESSMENT:  Patient here for procedure. The indication for the procedure, the patient's history and the patient's current medications are reviewed prior to the procedure and that data is reported on the endoscopy note in the chart to which this document is attached. PLAN:  Informed consent discussion held, patient afforded an opportunity to ask questions and all questions answered. After being advised of the risks, benefits, and alternatives, the patient requested that we proceed and indicated so on a written consent form. Will proceed with procedure as planned.   Vivian Kumar MD

## (undated) DEVICE — TUBING HYDR IRR --

## (undated) DEVICE — FCPS RAD JAW 4LC 240CM W/NDL -- BX/40